# Patient Record
Sex: MALE | Race: WHITE | Employment: FULL TIME | URBAN - METROPOLITAN AREA
[De-identification: names, ages, dates, MRNs, and addresses within clinical notes are randomized per-mention and may not be internally consistent; named-entity substitution may affect disease eponyms.]

---

## 2020-10-15 ENCOUNTER — APPOINTMENT (OUTPATIENT)
Dept: ULTRASOUND IMAGING | Age: 45
End: 2020-10-15
Payer: COMMERCIAL

## 2020-10-15 ENCOUNTER — APPOINTMENT (OUTPATIENT)
Dept: CT IMAGING | Age: 45
End: 2020-10-15
Payer: COMMERCIAL

## 2020-10-15 ENCOUNTER — HOSPITAL ENCOUNTER (EMERGENCY)
Age: 45
Discharge: HOME | End: 2020-10-15
Attending: STUDENT IN AN ORGANIZED HEALTH CARE EDUCATION/TRAINING PROGRAM
Payer: COMMERCIAL

## 2020-10-15 VITALS
OXYGEN SATURATION: 97 % | BODY MASS INDEX: 25.2 KG/M2 | RESPIRATION RATE: 16 BRPM | WEIGHT: 180 LBS | TEMPERATURE: 98.5 F | DIASTOLIC BLOOD PRESSURE: 96 MMHG | HEIGHT: 71 IN | HEART RATE: 109 BPM | SYSTOLIC BLOOD PRESSURE: 160 MMHG

## 2020-10-15 LAB
ALBUMIN SERPL-MCNC: 4.2 G/DL (ref 3.5–4.6)
ALP BLD-CCNC: 53 U/L (ref 35–104)
ALT SERPL-CCNC: 34 U/L (ref 0–41)
ANION GAP SERPL CALCULATED.3IONS-SCNC: 17 MEQ/L (ref 9–15)
APTT: 28.7 SEC (ref 24.4–36.8)
AST SERPL-CCNC: 28 U/L (ref 0–40)
BASOPHILS ABSOLUTE: 0.3 K/UL (ref 0–0.2)
BASOPHILS RELATIVE PERCENT: 2.1 %
BILIRUB SERPL-MCNC: 0.5 MG/DL (ref 0.2–0.7)
BUN BLDV-MCNC: 7 MG/DL (ref 6–20)
CALCIUM SERPL-MCNC: 9 MG/DL (ref 8.5–9.9)
CHLORIDE BLD-SCNC: 100 MEQ/L (ref 95–107)
CO2: 20 MEQ/L (ref 20–31)
CREAT SERPL-MCNC: 0.64 MG/DL (ref 0.7–1.2)
EOSINOPHILS ABSOLUTE: 0.1 K/UL (ref 0–0.7)
EOSINOPHILS RELATIVE PERCENT: 0.8 %
GFR AFRICAN AMERICAN: >60
GFR AFRICAN AMERICAN: >60
GFR NON-AFRICAN AMERICAN: >60
GFR NON-AFRICAN AMERICAN: >60
GLOBULIN: 3 G/DL (ref 2.3–3.5)
GLUCOSE BLD-MCNC: 79 MG/DL (ref 70–99)
HCT VFR BLD CALC: 47.5 % (ref 42–52)
HEMOGLOBIN: 16 G/DL (ref 14–18)
INR BLD: 0.9
LACTIC ACID: 2.2 MMOL/L (ref 0.5–2.2)
LYMPHOCYTES ABSOLUTE: 2.6 K/UL (ref 1–4.8)
LYMPHOCYTES RELATIVE PERCENT: 18.2 %
MCH RBC QN AUTO: 31.1 PG (ref 27–31.3)
MCHC RBC AUTO-ENTMCNC: 33.6 % (ref 33–37)
MCV RBC AUTO: 92.4 FL (ref 80–100)
MONOCYTES ABSOLUTE: 1.2 K/UL (ref 0.2–0.8)
MONOCYTES RELATIVE PERCENT: 7.9 %
NEUTROPHILS ABSOLUTE: 10.3 K/UL (ref 1.4–6.5)
NEUTROPHILS RELATIVE PERCENT: 71 %
PDW BLD-RTO: 12.9 % (ref 11.5–14.5)
PERFORMED ON: ABNORMAL
PLATELET # BLD: 297 K/UL (ref 130–400)
POC CREATININE WHOLE BLOOD: 0.7
POC CREATININE: 0.7 MG/DL (ref 0.9–1.3)
POC SAMPLE TYPE: ABNORMAL
POTASSIUM SERPL-SCNC: 3.9 MEQ/L (ref 3.4–4.9)
PROTHROMBIN TIME: 12.4 SEC (ref 12.3–14.9)
RBC # BLD: 5.14 M/UL (ref 4.7–6.1)
REASON FOR REJECTION: NORMAL
REJECTED TEST: NORMAL
SODIUM BLD-SCNC: 137 MEQ/L (ref 135–144)
TOTAL PROTEIN: 7.2 G/DL (ref 6.3–8)
WBC # BLD: 14.5 K/UL (ref 4.8–10.8)

## 2020-10-15 PROCEDURE — 36415 COLL VENOUS BLD VENIPUNCTURE: CPT

## 2020-10-15 PROCEDURE — 83605 ASSAY OF LACTIC ACID: CPT

## 2020-10-15 PROCEDURE — 80053 COMPREHEN METABOLIC PANEL: CPT

## 2020-10-15 PROCEDURE — 6360000002 HC RX W HCPCS: Performed by: STUDENT IN AN ORGANIZED HEALTH CARE EDUCATION/TRAINING PROGRAM

## 2020-10-15 PROCEDURE — 76870 US EXAM SCROTUM: CPT

## 2020-10-15 PROCEDURE — 85025 COMPLETE CBC W/AUTO DIFF WBC: CPT

## 2020-10-15 PROCEDURE — 99284 EMERGENCY DEPT VISIT MOD MDM: CPT

## 2020-10-15 PROCEDURE — 96374 THER/PROPH/DIAG INJ IV PUSH: CPT

## 2020-10-15 PROCEDURE — 6360000004 HC RX CONTRAST MEDICATION: Performed by: STUDENT IN AN ORGANIZED HEALTH CARE EDUCATION/TRAINING PROGRAM

## 2020-10-15 PROCEDURE — 74177 CT ABD & PELVIS W/CONTRAST: CPT

## 2020-10-15 PROCEDURE — 85730 THROMBOPLASTIN TIME PARTIAL: CPT

## 2020-10-15 PROCEDURE — 2580000003 HC RX 258: Performed by: STUDENT IN AN ORGANIZED HEALTH CARE EDUCATION/TRAINING PROGRAM

## 2020-10-15 PROCEDURE — 85610 PROTHROMBIN TIME: CPT

## 2020-10-15 PROCEDURE — 93975 VASCULAR STUDY: CPT

## 2020-10-15 RX ORDER — KETOROLAC TROMETHAMINE 30 MG/ML
30 INJECTION, SOLUTION INTRAMUSCULAR; INTRAVENOUS ONCE
Status: DISCONTINUED | OUTPATIENT
Start: 2020-10-15 | End: 2020-10-15

## 2020-10-15 RX ORDER — KETOROLAC TROMETHAMINE 30 MG/ML
30 INJECTION, SOLUTION INTRAMUSCULAR; INTRAVENOUS ONCE
Status: COMPLETED | OUTPATIENT
Start: 2020-10-15 | End: 2020-10-15

## 2020-10-15 RX ORDER — OXYCODONE HYDROCHLORIDE AND ACETAMINOPHEN 5; 325 MG/1; MG/1
1 TABLET ORAL EVERY 6 HOURS PRN
Qty: 12 TABLET | Refills: 0 | Status: SHIPPED | OUTPATIENT
Start: 2020-10-15 | End: 2020-10-18

## 2020-10-15 RX ORDER — 0.9 % SODIUM CHLORIDE 0.9 %
500 INTRAVENOUS SOLUTION INTRAVENOUS ONCE
Status: COMPLETED | OUTPATIENT
Start: 2020-10-15 | End: 2020-10-15

## 2020-10-15 RX ORDER — NAPROXEN 500 MG/1
500 TABLET ORAL 2 TIMES DAILY
Qty: 20 TABLET | Refills: 0 | Status: SHIPPED | OUTPATIENT
Start: 2020-10-15 | End: 2020-10-27

## 2020-10-15 RX ADMIN — IOPAMIDOL 100 ML: 612 INJECTION, SOLUTION INTRAVENOUS at 12:05

## 2020-10-15 RX ADMIN — SODIUM CHLORIDE 500 ML: 9 INJECTION, SOLUTION INTRAVENOUS at 11:02

## 2020-10-15 RX ADMIN — KETOROLAC TROMETHAMINE 30 MG: 30 INJECTION, SOLUTION INTRAMUSCULAR; INTRAVENOUS at 11:02

## 2020-10-15 ASSESSMENT — ENCOUNTER SYMPTOMS
BACK PAIN: 0
SINUS PRESSURE: 0
SHORTNESS OF BREATH: 0
NAUSEA: 0
ABDOMINAL PAIN: 0
TROUBLE SWALLOWING: 0
CHEST TIGHTNESS: 0
VOMITING: 0
COUGH: 0
DIARRHEA: 0

## 2020-10-15 ASSESSMENT — PAIN DESCRIPTION - ORIENTATION: ORIENTATION: LEFT

## 2020-10-15 ASSESSMENT — PAIN DESCRIPTION - PAIN TYPE: TYPE: ACUTE PAIN

## 2020-10-15 ASSESSMENT — PAIN DESCRIPTION - DESCRIPTORS: DESCRIPTORS: THROBBING;SQUEEZING

## 2020-10-15 ASSESSMENT — PAIN DESCRIPTION - LOCATION: LOCATION: SCROTUM

## 2020-10-15 ASSESSMENT — PAIN DESCRIPTION - FREQUENCY: FREQUENCY: CONTINUOUS

## 2020-10-15 ASSESSMENT — PAIN SCALES - GENERAL
PAINLEVEL_OUTOF10: 9
PAINLEVEL_OUTOF10: 9

## 2020-10-15 NOTE — ED PROVIDER NOTES
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 Throughout the day, his testicle started swelling. Now the size of an egg         HISTORY OF PRESENT ILLNESS   (Location/Symptom, Timing/Onset,Context/Setting, Quality, Duration, Modifying Factors, Severity)  Note limiting factors. Nandini Dhillon is a 41 y.o. male who presents to the emergency department with complaint of groin swelling since yesterday. Patient states he was at work and he lifted a very heavy hoist and then his left testicle become swollen yesterday. Patient states now both sides are swollen. On exam patient has diffuse swelling to the scrotum. There is no redness to the skin. Patient is not able to tolerate inguinal hernia exam.  On exam his abdomen is soft and nontender. Patient denies any nausea, vomiting or diarrhea. Patient denies any pain with urination. Patient states that the scrotum itself hurts with touch and movement and position change. The history is provided by the patient. NursingNotes were reviewed. REVIEW OF SYSTEMS    (2-9 systems for level 4, 10 or more for level 5)     Review of Systems   Constitutional: Negative for activity change, appetite change, chills, fever and unexpected weight change. HENT: Negative for drooling, ear pain, nosebleeds, sinus pressure and trouble swallowing. Respiratory: Negative for cough, chest tightness and shortness of breath. Cardiovascular: Negative for chest pain and leg swelling. Gastrointestinal: Negative for abdominal pain, diarrhea, nausea and vomiting. Endocrine: Negative for polydipsia and polyphagia. Genitourinary: Positive for scrotal swelling and testicular pain.  Negative for dysuria, flank pain and frequency. Musculoskeletal: Negative for back pain and myalgias. Skin: Negative for pallor and rash. Neurological: Negative for syncope, weakness and headaches. Hematological: Does not bruise/bleed easily. All other systems reviewed and are negative. Except as noted above the remainder of the review of systems was reviewed and negative. PAST MEDICAL HISTORY   History reviewed. No pertinent past medical history. SURGICALHISTORY     History reviewed. No pertinent surgical history. CURRENT MEDICATIONS       Previous Medications    No medications on file       ALLERGIES     Patient has no known allergies. FAMILY HISTORY     History reviewed. No pertinent family history.        SOCIAL HISTORY       Social History     Socioeconomic History    Marital status: Single     Spouse name: None    Number of children: None    Years of education: None    Highest education level: None   Occupational History    None   Social Needs    Financial resource strain: None    Food insecurity     Worry: None     Inability: None    Transportation needs     Medical: None     Non-medical: None   Tobacco Use    Smoking status: Former Smoker    Smokeless tobacco: Never Used   Substance and Sexual Activity    Alcohol use: Yes     Comment: 6 pack a day    Drug use: Not Currently    Sexual activity: None   Lifestyle    Physical activity     Days per week: None     Minutes per session: None    Stress: None   Relationships    Social connections     Talks on phone: None     Gets together: None     Attends Mor[employer] service: None     Active member of club or organization: None     Attends meetings of clubs or organizations: None     Relationship status: None    Intimate partner violence     Fear of current or ex partner: None     Emotionally abused: None     Physically abused: None     Forced sexual activity: None   Other Topics Concern    None   Social History Narrative    None       SCREENINGS    Rose Mary Coma Scale  Eye Opening: Spontaneous  Best Verbal Response: Oriented  Best Motor Response: Obeys commands  Rose Mary Coma Scale Score: 15 @FLOW([id number])@      PHYSICAL EXAM    (up to 7 for level 4, 8 or more for level 5)     ED Triage Vitals [10/15/20 1030]   BP Temp Temp Source Pulse Resp SpO2 Height Weight   (!) 150/102 98.5 °F (36.9 °C) Oral 125 18 96 % 5' 11\" (1.803 m) 180 lb (81.6 kg)       Physical Exam  Vitals signs and nursing note reviewed. Constitutional:       General: He is awake. He is in acute distress ( secondary to scrotal pain). Appearance: Normal appearance. He is well-developed and normal weight. He is not ill-appearing, toxic-appearing or diaphoretic. Comments: No photophobia. No phonophobia. HENT:      Head: Normocephalic and atraumatic. No Roberts's sign. Right Ear: External ear normal.      Left Ear: External ear normal.      Nose: Nose normal. No congestion or rhinorrhea. Mouth/Throat:      Mouth: Mucous membranes are moist.      Pharynx: Oropharynx is clear. No oropharyngeal exudate or posterior oropharyngeal erythema. Eyes:      General: No scleral icterus. Right eye: No foreign body or discharge. Left eye: No discharge. Extraocular Movements: Extraocular movements intact. Conjunctiva/sclera: Conjunctivae normal.      Left eye: No exudate. Pupils: Pupils are equal, round, and reactive to light. Neck:      Musculoskeletal: Normal range of motion and neck supple. No neck rigidity. Vascular: No JVD. Trachea: No tracheal deviation. Comments: No meningismus. Cardiovascular:      Rate and Rhythm: Normal rate and regular rhythm. Heart sounds: Normal heart sounds. Heart sounds not distant. No murmur. No friction rub. No gallop. Pulmonary:      Effort: Pulmonary effort is normal. No respiratory distress. Breath sounds: Normal breath sounds. No stridor. No wheezing, rhonchi or rales. Chest:      Chest wall: No tenderness. Abdominal:      General: Abdomen is flat. Bowel sounds are normal. There is no distension. Palpations: Abdomen is soft. There is no mass. Tenderness: There is no abdominal tenderness. There is no right CVA tenderness, left CVA tenderness, guarding or rebound. Hernia: No hernia is present. Genitourinary:     Pubic Area: No rash or pubic lice. Penis: Circumcised. Scrotum/Testes:         Right: Swelling present. Left: Swelling present. Musculoskeletal: Normal range of motion. General: No swelling, tenderness, deformity or signs of injury. Lymphadenopathy:      Head:      Right side of head: No submental adenopathy. Left side of head: No submental adenopathy. Skin:     General: Skin is warm and dry. Capillary Refill: Capillary refill takes less than 2 seconds. Coloration: Skin is not jaundiced or pale. Findings: No bruising, erythema, lesion or rash. Neurological:      General: No focal deficit present. Mental Status: He is alert and oriented to person, place, and time. Mental status is at baseline. Cranial Nerves: No cranial nerve deficit. Sensory: No sensory deficit. Motor: No weakness. Coordination: Coordination normal.      Deep Tendon Reflexes: Reflexes are normal and symmetric. Psychiatric:         Mood and Affect: Mood normal.         Behavior: Behavior normal. Behavior is cooperative. Thought Content:  Thought content normal.         Judgment: Judgment normal.         DIAGNOSTIC RESULTS     EKG: All EKG's are interpreted by the Emergency Department Physician who either signs or Co-signsthis chart in the absence of a cardiologist.        RADIOLOGY:   Non-plain filmimages such as CT, Ultrasound and MRI are read by the radiologist. Plain radiographic images are visualized and preliminarily interpreted by the emergency physician with the below findings:        Interpretation per the Radiologist below, if available at the time ofthis note:    [address]   Final Result      Left epididymitis, which can be associated with heavy lifting. No evidence for torsion. US DUP ABD PEL RETRO SCROT COMPLETE   Final Result      Left epididymitis, which can be associated with heavy lifting. No evidence for torsion. CT ABDOMEN PELVIS W IV CONTRAST Additional Contrast? None   Final Result      No significant inguinal hernia. Left epididymitis, better appreciated on the recent scrotal ultrasound.                     ==========               ED BEDSIDE ULTRASOUND:   Performed by ED Physician - none    LABS:  Labs Reviewed   CBC WITH AUTO DIFFERENTIAL - Abnormal; Notable for the following components:       Result Value    WBC 14.5 (*)     Neutrophils Absolute 10.3 (*)     Monocytes Absolute 1.2 (*)     Basophils Absolute 0.3 (*)     All other components within normal limits   COMPREHENSIVE METABOLIC PANEL - Abnormal; Notable for the following components:    Anion Gap 17 (*)     CREATININE 0.64 (*)     All other components within normal limits    Narrative:     REDRAW CALLED TO JD   POCT CREATININE - URINE - Normal   PROTIME-INR   APTT   LACTIC ACID, PLASMA   SPECIMEN REJECTION    Narrative:     NOTIFIED RAFAEL ABOUT HEMOLYZED SPECIMEN   URINE RT REFLEX TO CULTURE       All other labs were within normal range or not returned as of this dictation. EMERGENCY DEPARTMENT COURSE and DIFFERENTIAL DIAGNOSIS/MDM:   Vitals:    Vitals:    10/15/20 1030 10/15/20 1219   BP: (!) 150/102 (!) 160/96   Pulse: 125 109   Resp: 18 16   Temp: 98.5 °F (36.9 °C)    TempSrc: Oral    SpO2: 96% 97%   Weight: 180 lb (81.6 kg)    Height: 5' 11\" (1.803 m)            MDM  Patient has stress-induced epididymitis.   The ER physician spoke with the radiologist to discuss an article from occupational environmental medicine for 1997 showing that this can occur. Patient denies any STD exposure. Patient states that he was lifting something on a hoist that was more than 400 pounds a felt pain. The occupational injury likely has caused this epididymitis. Patient would not be served well by getting antibiotics this is not the process. A backflow of urine probably caused the epididymitis. Findings were discussed with the patient pain medicines have been prescribed. On reexamination the findings were discussed with the patient he verbalized understanding the care. Patient will follow-up with occupational medicine. The patient has no further questions the care discussed with him. CONSULTS:  None    PROCEDURES:  Unless otherwise noted below, none     Procedures    FINAL IMPRESSION      1. Epididymitis, left          DISPOSITION/PLAN   DISPOSITION Decision To Discharge 10/15/2020 12:57:04 PM      PATIENT REFERRED TO:  Yash Crespo MD  [address] [facility] [address]  [place] [phone]    Call today  Make a follow-up arrangement      DISCHARGE MEDICATIONS:  New Prescriptions    NAPROXEN (NAPROSYN) 500 MG TABLET    Take 1 tablet by mouth 2 times daily for 20 doses    OXYCODONE-ACETAMINOPHEN (PERCOCET) 5-325 MG PER TABLET    Take 1 tablet by mouth every 6 hours as needed for Pain for up to 3 days. WARNING:  May cause drowsiness. May impair ability to operate vehicles or machinery. Do not use in combination with alcohol.           (Please note that portions of this note were completed with a voice recognition program.  Efforts were made to edit the dictations but occasionally words are mis-transcribed.)    Jorie Bosworth, DO (electronically signed)  Attending Emergency Physician          Jorie Bosworth, DO  10/15/20 2542

## 2020-10-15 NOTE — ED NOTES
This RN at bedside to assess pt. Upon assessment, pt is A/Ox4, skin p/w/d, resp even and unlabored, msp's intact.       Zak Becerra, KATARZYNA  10/15/20 5827

## 2020-10-15 NOTE — ED TRIAGE NOTES
A & Ox4. Skin pink warm and dry. States he was using a hoist and felt something pull in his left groin. States his left testicle started swelling throughout the day and now is about the size of an egg. States he could barely sleep and can't sit very easily.

## 2020-10-27 ENCOUNTER — OFFICE VISIT (OUTPATIENT)
Dept: UROLOGY | Age: 45
End: 2020-10-27
Payer: COMMERCIAL

## 2020-10-27 VITALS
HEART RATE: 124 BPM | WEIGHT: 180 LBS | DIASTOLIC BLOOD PRESSURE: 98 MMHG | SYSTOLIC BLOOD PRESSURE: 158 MMHG | HEIGHT: 71 IN | BODY MASS INDEX: 25.2 KG/M2

## 2020-10-27 LAB
BILIRUBIN, POC: ABNORMAL
BLOOD URINE, POC: ABNORMAL
CLARITY, POC: CLEAR
COLOR, POC: YELLOW
GLUCOSE URINE, POC: ABNORMAL
KETONES, POC: ABNORMAL
LEUKOCYTE EST, POC: ABNORMAL
NITRITE, POC: ABNORMAL
PH, POC: 6
PROTEIN, POC: ABNORMAL
SPECIFIC GRAVITY, POC: 1.02
UROBILINOGEN, POC: 1

## 2020-10-27 PROCEDURE — 81003 URINALYSIS AUTO W/O SCOPE: CPT | Performed by: UROLOGY

## 2020-10-27 PROCEDURE — 99203 OFFICE O/P NEW LOW 30 MIN: CPT | Performed by: UROLOGY

## 2020-10-27 NOTE — PROGRESS NOTES
[facility] EVALUATION NOTE                                                 H&P                                                                                                                                                 Reason for Visit  Left epididymitis    History of Present Illness  71-year-old male evaluated emergency room for left epididymal pain/hemiscrotal pain 2 weeks ago  Ultrasound reviewed with patient  Left epididymitis, which can be associated with heavy lifting.         No evidence for torsion     No evidence of testicular lesions  Mild left epididymal fullness  Patient currently doing well minimal discomfort  Currently on light duty  Urine culture was negative  Finished antibiotic  Urologic Review of Systems/Symptoms  No previous history of epididymitis    Review of Systems  Hospitalization: No recent admissions to the hospital  All 14 categories of Review of Systems otherwise reviewed no other findings reported. History reviewed. No pertinent past medical history. History reviewed. No pertinent surgical history.   Social History     Socioeconomic History    Marital status: Single     Spouse name: None    Number of children: None    Years of education: None    Highest education level: None   Occupational History    None   Social Needs    Financial resource strain: None    Food insecurity     Worry: None     Inability: None    Transportation needs     Medical: None     Non-medical: None   Tobacco Use    Smoking status: Never Smoker    Smokeless tobacco: Never Used   Substance and Sexual Activity    Alcohol use: Yes     Comment: 6 pack a day    Drug use: Not Currently    Sexual activity: None   Lifestyle    Physical activity     Days per week: None     Minutes per session: None    Stress: None   Relationships    Social connections     Talks on phone: None     Gets together: None     Attends Jew service: None     Active member of club or organization: None Attends meetings of clubs or organizations: None     Relationship status: None    Intimate partner violence     Fear of current or ex partner: None     Emotionally abused: None     Physically abused: None     Forced sexual activity: None   Other Topics Concern    None   Social History Narrative    None     History reviewed. No pertinent family history. No current outpatient medications on file. No current facility-administered medications for this visit. Patient has no known allergies. All reviewed and verified by Dr Oj Hayden on today's visit    No results found for: PSA, PSADIA  Results for POC orders placed in visit on 10/27/20   POCT Urinalysis No Micro (Auto)   Result Value Ref Range    Color, UA yellow     Clarity, UA clear     Glucose, UA POC neg     Bilirubin, UA small     Ketones, UA neg     Spec Grav, UA 1.025     Blood, UA POC neg     pH, UA 6.0     Protein, UA POC neg     Urobilinogen, UA 1.0     Leukocytes, UA neg     Nitrite, UA neg        Physical Exam  Vitals:    10/27/20 0854 10/27/20 0859   BP: (!) 170/102 (!) 158/98   Pulse: 124    Weight: 180 lb (81.6 kg)    Height: 5' 11\" (1.803 m)      Constitutional: Not in distress. Head and Neck: No lymphadenopathy  Cardiovascular: Normal rate, BP reviewed. Normal rhythm  Pulmonary/Chest: Normal respiratory effort no wheezing  Abdominal: Not distended. No hernias  Urologic Exam  Circumcised penis normal meatus. Right testicle mildly tender question of epididymal cysts. Left testicle mildly tender questionable epididymal cysts minimal hydrocele  Prostate exam not indicated. Musculoskeletal: Ambulatory. Extremities: No edema  Neurological: Intact  Skin: No rashes  Psychiatric: Normal affect.   Assessment/Medical Necessity-Decision Making  Acute left epididymoorchitis probably secondary to ruptured epididymal cyst no evidence of infection all cultures were negative  Patient's symptoms have improved significantly  Exam essentially unremarkable other than epididymal fullness  Ultrasound unremarkable  Plan  Finish antibiotic  Anti-inflammatories if flareup  Follow-up in office if self-exam changes  Greater than 50% of 35 minutes spent consulting patient face-to-face  Orders Placed This Encounter   Procedures    POCT Urinalysis No Micro (Auto)     No orders of the defined types were placed in this encounter. Chris Christiansen MD       Please note this report has been partially produced using speech recognition software  And may cause contain errors related to that system including grammar, punctuation and spelling as well as words and phrases that may seem inappropriate. If there are questions or concerns please feel free to contact me to clarify.

## 2021-05-04 ENCOUNTER — APPOINTMENT (OUTPATIENT)
Dept: CT IMAGING | Age: 46
End: 2021-05-04
Payer: COMMERCIAL

## 2021-05-04 ENCOUNTER — APPOINTMENT (OUTPATIENT)
Dept: GENERAL RADIOLOGY | Age: 46
End: 2021-05-04
Payer: COMMERCIAL

## 2021-05-04 ENCOUNTER — HOSPITAL ENCOUNTER (EMERGENCY)
Age: 46
Discharge: HOME | End: 2021-05-04
Payer: COMMERCIAL

## 2021-05-04 VITALS
HEART RATE: 106 BPM | OXYGEN SATURATION: 100 % | SYSTOLIC BLOOD PRESSURE: 119 MMHG | WEIGHT: 185 LBS | BODY MASS INDEX: 25.9 KG/M2 | DIASTOLIC BLOOD PRESSURE: 85 MMHG | RESPIRATION RATE: 15 BRPM | HEIGHT: 71 IN | TEMPERATURE: 98.3 F

## 2021-05-04 DIAGNOSIS — K92.2 UPPER GI BLEED: Primary | ICD-10-CM

## 2021-05-04 LAB
ALBUMIN SERPL-MCNC: 3.9 G/DL (ref 3.5–4.6)
ALP BLD-CCNC: 40 U/L (ref 35–104)
ALT SERPL-CCNC: 16 U/L (ref 0–41)
AMPHETAMINE SCREEN, URINE: ABNORMAL
ANION GAP SERPL CALCULATED.3IONS-SCNC: 11 MEQ/L (ref 9–15)
AST SERPL-CCNC: 19 U/L (ref 0–40)
BARBITURATE SCREEN URINE: ABNORMAL
BASOPHILS ABSOLUTE: 0.1 K/UL (ref 0–0.2)
BASOPHILS RELATIVE PERCENT: 0.6 %
BENZODIAZEPINE SCREEN, URINE: ABNORMAL
BILIRUB SERPL-MCNC: 0.3 MG/DL (ref 0.2–0.7)
BUN BLDV-MCNC: 20 MG/DL (ref 6–20)
CALCIUM SERPL-MCNC: 9.1 MG/DL (ref 8.5–9.9)
CANNABINOID SCREEN URINE: POSITIVE
CHLORIDE BLD-SCNC: 101 MEQ/L (ref 95–107)
CO2: 26 MEQ/L (ref 20–31)
COCAINE METABOLITE SCREEN URINE: ABNORMAL
CREAT SERPL-MCNC: 0.71 MG/DL (ref 0.7–1.2)
EKG ATRIAL RATE: 134 BPM
EKG P AXIS: 55 DEGREES
EKG P-R INTERVAL: 120 MS
EKG Q-T INTERVAL: 312 MS
EKG QRS DURATION: 78 MS
EKG QTC CALCULATION (BAZETT): 465 MS
EKG R AXIS: 32 DEGREES
EKG T AXIS: 44 DEGREES
EKG VENTRICULAR RATE: 134 BPM
EOSINOPHILS ABSOLUTE: 0.1 K/UL (ref 0–0.7)
EOSINOPHILS RELATIVE PERCENT: 0.6 %
ETHANOL PERCENT: NORMAL G/DL
ETHANOL: <10 MG/DL (ref 0–0.08)
GFR AFRICAN AMERICAN: >60
GFR AFRICAN AMERICAN: >60
GFR NON-AFRICAN AMERICAN: >60
GFR NON-AFRICAN AMERICAN: >60
GLOBULIN: 1.8 G/DL (ref 2.3–3.5)
GLUCOSE BLD-MCNC: 125 MG/DL (ref 70–99)
HCT VFR BLD CALC: 25.6 % (ref 42–52)
HEMOGLOBIN: 8.7 G/DL (ref 14–18)
LYMPHOCYTES ABSOLUTE: 2.3 K/UL (ref 1–4.8)
LYMPHOCYTES RELATIVE PERCENT: 28.2 %
Lab: ABNORMAL
MCH RBC QN AUTO: 31.3 PG (ref 27–31.3)
MCHC RBC AUTO-ENTMCNC: 34.2 % (ref 33–37)
MCV RBC AUTO: 91.7 FL (ref 80–100)
METHADONE SCREEN, URINE: ABNORMAL
MONOCYTES ABSOLUTE: 0.6 K/UL (ref 0.2–0.8)
MONOCYTES RELATIVE PERCENT: 7.5 %
NEUTROPHILS ABSOLUTE: 5 K/UL (ref 1.4–6.5)
NEUTROPHILS RELATIVE PERCENT: 63.1 %
OPIATE SCREEN URINE: ABNORMAL
OXYCODONE URINE: ABNORMAL
PDW BLD-RTO: 13.4 % (ref 11.5–14.5)
PERFORMED ON: ABNORMAL
PHENCYCLIDINE SCREEN URINE: ABNORMAL
PLATELET # BLD: 253 K/UL (ref 130–400)
POC CREATININE: 0.6 MG/DL (ref 0.9–1.3)
POC SAMPLE TYPE: ABNORMAL
POTASSIUM SERPL-SCNC: 3.6 MEQ/L (ref 3.4–4.9)
PROCALCITONIN: 0.2 NG/ML (ref 0–0.15)
PROPOXYPHENE SCREEN: ABNORMAL
RBC # BLD: 2.79 M/UL (ref 4.7–6.1)
SARS-COV-2, NAAT: NOT DETECTED
SODIUM BLD-SCNC: 138 MEQ/L (ref 135–144)
TOTAL PROTEIN: 5.7 G/DL (ref 6.3–8)
TROPONIN: <0.01 NG/ML (ref 0–0.01)
WBC # BLD: 8 K/UL (ref 4.8–10.8)

## 2021-05-04 PROCEDURE — 93005 ELECTROCARDIOGRAM TRACING: CPT | Performed by: PHYSICIAN ASSISTANT

## 2021-05-04 PROCEDURE — 74177 CT ABD & PELVIS W/CONTRAST: CPT

## 2021-05-04 PROCEDURE — 6360000004 HC RX CONTRAST MEDICATION: Performed by: PHYSICIAN ASSISTANT

## 2021-05-04 PROCEDURE — 84484 ASSAY OF TROPONIN QUANT: CPT

## 2021-05-04 PROCEDURE — 85025 COMPLETE CBC W/AUTO DIFF WBC: CPT

## 2021-05-04 PROCEDURE — C9113 INJ PANTOPRAZOLE SODIUM, VIA: HCPCS | Performed by: PHYSICIAN ASSISTANT

## 2021-05-04 PROCEDURE — 87635 SARS-COV-2 COVID-19 AMP PRB: CPT

## 2021-05-04 PROCEDURE — 36415 COLL VENOUS BLD VENIPUNCTURE: CPT

## 2021-05-04 PROCEDURE — 2580000003 HC RX 258: Performed by: PHYSICIAN ASSISTANT

## 2021-05-04 PROCEDURE — 93010 ELECTROCARDIOGRAM REPORT: CPT | Performed by: INTERNAL MEDICINE

## 2021-05-04 PROCEDURE — 71045 X-RAY EXAM CHEST 1 VIEW: CPT

## 2021-05-04 PROCEDURE — 84145 PROCALCITONIN (PCT): CPT

## 2021-05-04 PROCEDURE — 80307 DRUG TEST PRSMV CHEM ANLYZR: CPT

## 2021-05-04 PROCEDURE — 71275 CT ANGIOGRAPHY CHEST: CPT

## 2021-05-04 PROCEDURE — 80053 COMPREHEN METABOLIC PANEL: CPT

## 2021-05-04 PROCEDURE — 99283 EMERGENCY DEPT VISIT LOW MDM: CPT

## 2021-05-04 PROCEDURE — 96374 THER/PROPH/DIAG INJ IV PUSH: CPT

## 2021-05-04 PROCEDURE — 6360000002 HC RX W HCPCS: Performed by: PHYSICIAN ASSISTANT

## 2021-05-04 PROCEDURE — 82077 ASSAY SPEC XCP UR&BREATH IA: CPT

## 2021-05-04 RX ORDER — PANTOPRAZOLE SODIUM 40 MG/1
40 TABLET, DELAYED RELEASE ORAL
Qty: 28 TABLET | Refills: 0 | Status: SHIPPED | OUTPATIENT
Start: 2021-05-04

## 2021-05-04 RX ORDER — 0.9 % SODIUM CHLORIDE 0.9 %
1000 INTRAVENOUS SOLUTION INTRAVENOUS ONCE
Status: COMPLETED | OUTPATIENT
Start: 2021-05-04 | End: 2021-05-04

## 2021-05-04 RX ORDER — SODIUM CHLORIDE 9 MG/ML
10 INJECTION INTRAVENOUS ONCE
Status: COMPLETED | OUTPATIENT
Start: 2021-05-04 | End: 2021-05-04

## 2021-05-04 RX ORDER — ONDANSETRON 2 MG/ML
4 INJECTION INTRAMUSCULAR; INTRAVENOUS ONCE
Status: DISCONTINUED | OUTPATIENT
Start: 2021-05-04 | End: 2021-05-04 | Stop reason: HOSPADM

## 2021-05-04 RX ORDER — PANTOPRAZOLE SODIUM 40 MG/10ML
40 INJECTION, POWDER, LYOPHILIZED, FOR SOLUTION INTRAVENOUS ONCE
Status: COMPLETED | OUTPATIENT
Start: 2021-05-04 | End: 2021-05-04

## 2021-05-04 RX ADMIN — SODIUM CHLORIDE 1000 ML: 9 INJECTION, SOLUTION INTRAVENOUS at 11:14

## 2021-05-04 RX ADMIN — SODIUM CHLORIDE 1000 ML: 9 INJECTION, SOLUTION INTRAVENOUS at 11:15

## 2021-05-04 RX ADMIN — SODIUM CHLORIDE, PRESERVATIVE FREE 10 ML: 5 INJECTION INTRAVENOUS at 13:01

## 2021-05-04 RX ADMIN — PANTOPRAZOLE SODIUM 40 MG: 40 INJECTION, POWDER, FOR SOLUTION INTRAVENOUS at 13:01

## 2021-05-04 RX ADMIN — IOPAMIDOL 100 ML: 755 INJECTION, SOLUTION INTRAVENOUS at 12:04

## 2021-05-04 ASSESSMENT — ENCOUNTER SYMPTOMS
EYE PAIN: 0
COUGH: 1
BACK PAIN: 0
PHOTOPHOBIA: 0
RHINORRHEA: 0
SHORTNESS OF BREATH: 1
ABDOMINAL PAIN: 0
VOMITING: 1
NAUSEA: 1
SORE THROAT: 0
DIARRHEA: 0

## 2021-05-04 NOTE — ED NOTES
Educated pt about the importance of following up with GI. Pt states understanding.       Dipak Olivier RN  05/04/21 9796

## 2021-05-04 NOTE — ED TRIAGE NOTES
Pt was sent to the ER from a urgent care for an elevated heart rate, c/o SOB, body aches and vomiting, Pt is A&OX3, calm, ambulatory, afebrile, breathes are equal and unlabored.

## 2021-05-04 NOTE — ED NOTES
Pt arrived with complaint of shortness of breath. Pt reports the shortness of breath is intermittent. Pt reports the shortness of breath started about Sunday. Pt reports they have not taken any medications. Pt reports activity increases the shortness of breath. Pt reports sitting decreases the shortness of breath. Pt states that fatigue, cold sweats, vomiting and nausea. Pt is A & O x 4 . Pt arrived with elevated HR after going to the urgent care. Pt states that he hasn't been feeling right since about Sunday. States that he has seasonal allergies and that he has had allergies but that he was nauseous yesterday and vomited. Pt states that at this time he is not having any chest pain or discomfort. Pt states that he has been SOB and that he has [place]ovanna hr\". Pt states lung sounds slightly diminished.       Jose Caputo RN  05/04/21 1128

## 2021-05-04 NOTE — ED PROVIDER NOTES
[address] [facility] ED  eMERGENCY dEPARTMENTeNCOUnter      Pt Name: Ramses Sorensen  MRN: [id number]  [place] 1975  Date ofevaluation: 5/4/2021  Provider: José Miguel Coronado PA-C    CHIEF COMPLAINT       Chief Complaint   Patient presents with    Tachycardia     pt was sent to the ER from urgent care for a rapid heart rate, c/o SOB,  body aches, and vomiting for the past two days         HISTORY OF PRESENT ILLNESS   (Location/Symptom, Timing/Onset,Context/Setting, Quality, Duration, Modifying Factors, Severity)  Note limiting factors. Ramses Sorensen is a 40 y.o. male who presents to the emergency department shortness of breath body aches nausea and vomiting. Patient was sent over from urgent care due to tachycardia. He states that he does feel like his heart is racing when he does go up the steps and gets little short of breath. He denies any chest pain though. HPI    NursingNotes were reviewed. REVIEW OF SYSTEMS    (2-9 systems for level 4, 10 or more for level 5)     Review of Systems   Constitutional: Negative for chills, diaphoresis, fatigue and fever. HENT: Negative for congestion, rhinorrhea and sore throat. Eyes: Negative for photophobia and pain. Respiratory: Positive for cough and shortness of breath. Cardiovascular: Positive for palpitations. Negative for chest pain. Gastrointestinal: Positive for nausea and vomiting. Negative for abdominal pain and diarrhea. Genitourinary: Negative for dysuria and flank pain. Musculoskeletal: Negative for back pain. Skin: Negative for rash. Neurological: Negative for dizziness, light-headedness and headaches. Psychiatric/Behavioral: Negative. All other systems reviewed and are negative. Except as noted above the remainder of the review of systems was reviewed and negative. PAST MEDICAL HISTORY     Past Medical History:   Diagnosis Date    Asthma          SURGICALHISTORY     History reviewed.  No pertinent surgical atraumatic. Eyes:      General: Lids are normal.      Conjunctiva/sclera: Conjunctivae normal.   Neck:      Musculoskeletal: Normal range of motion and neck supple. Cardiovascular:      Rate and Rhythm: Normal rate and regular rhythm. Pulses: Normal pulses. Heart sounds: Normal heart sounds. Pulmonary:      Effort: Pulmonary effort is normal.      Breath sounds: Normal breath sounds. Abdominal:      General: Bowel sounds are normal.      Palpations: Abdomen is soft. Tenderness: There is no abdominal tenderness. Lymphadenopathy:      Cervical: No cervical adenopathy. Skin:     General: Skin is warm and dry. Capillary Refill: Capillary refill takes less than 2 seconds. Coloration: Skin is pale. Findings: No rash. Neurological:      Mental Status: He is alert and oriented to person, place, and time. Psychiatric:         Thought Content: Thought content normal.         Judgment: Judgment normal.         DIAGNOSTIC RESULTS     EKG: All EKG's are interpreted by the Emergency Department Physician who either signs or Co-signsthis chart in the absence of a cardiologist.    Sinus tach 134bpm no acute st changes no ectopy    RADIOLOGY:   Non-plain filmimages such as CT, Ultrasound and MRI are read by the radiologist. Plain radiographic images are visualized and preliminarily interpreted by the emergency physician with the below findings:        Interpretation per the Radiologist below, if available at the time ofthis note:    CTA Chest W WO  (PE study)   Final Result   No CT evidence pulmonary embolism. All CT scans at this facility use dose modulation, iterative reconstruction, and/or weight based dosing when appropriate to reduce radiation dose to as low as reasonably achievable. CT ABDOMEN PELVIS W IV CONTRAST Additional Contrast? None   Final Result      No acute findings.          All CT scans at this facility use dose modulation, iterative reconstruction, and/or weight based dosing when appropriate to reduce radiation dose to as low as reasonably achievable. XR CHEST PORTABLE   Final Result   NO ACUTE CARDIOPULMONARY DISEASE. ED BEDSIDE ULTRASOUND:   Performed by ED Physician - none    LABS:  Labs Reviewed   COMPREHENSIVE METABOLIC PANEL - Abnormal; Notable for the following components:       Result Value    Glucose 125 (*)     Total Protein 5.7 (*)     Globulin 1.8 (*)     All other components within normal limits   URINE DRUG SCREEN - Abnormal; Notable for the following components:    Cannabinoid Scrn, Ur POSITIVE (*)     All other components within normal limits   CBC WITH AUTO DIFFERENTIAL - Abnormal; Notable for the following components:    RBC 2.79 (*)     Hemoglobin 8.7 (*)     Hematocrit 25.6 (*)     All other components within normal limits   PROCALCITONIN - Abnormal; Notable for the following components:    Procalcitonin 0.20 (*)     All other components within normal limits   COVID-19, RAPID   ETHANOL   TROPONIN       All other labs were within normal range or not returned as of this dictation. EMERGENCY DEPARTMENT COURSE and DIFFERENTIAL DIAGNOSIS/MDM:   Vitals:    Vitals:    05/04/21 1130 05/04/21 1215 05/04/21 1230 05/04/21 1315   BP: 123/79 (!) 128/101 119/85    Pulse: 110 115 119 106   Resp: 14 18 15    Temp:       TempSrc:       SpO2: 100% 100% 100% 100%   Weight:       Height:               MDM     Pt is tachycardic and having sob. Hemoglobin found to be 8.7, when baseline 6months ago was 16. I think symptoms are from a GI bleed. Pt declines rectal exam to check for blood in stool. He denies dark stool or blood in his stool. Rectal exam shows black stool that is guaic positive. Pt symptoms I believe are from a GI bleed. Pt was given protonix. Pt was given 2L Iv fluids. Patient declines is scheduling him a GI appointment. I advised for patient to follow-up with GI tomorrow.   Vies him to return to the ED for dizziness headache shortness of breath fatigue weakness palpitations. Patient verbalizes understanding he was started on Protonix outpatient only and encouraged to follow-up with GI immediately. Patient stable for discharge. REASSESSMENT          CRITICAL CARE TIME   Total Critical Care time was  minutes, excluding separatelyreportable procedures. There was a high probability ofclinically significant/life threatening deterioration in the patient's condition which required my urgent intervention. CONSULTS:  None    PROCEDURES:  Unless otherwise noted below, none     Procedures    FINAL IMPRESSION      1.  Upper GI bleed          DISPOSITION/PLAN   DISPOSITION        PATIENT REFERREDTO:  Vito Overton MD  [address] [place] [address] [phone]    Schedule an appointment as soon as possible for a visit in 2 days        DISCHARGEMEDICATIONS:  Discharge Medication List as of 5/4/2021  1:19 PM      START taking these medications    Details   pantoprazole (PROTONIX) 40 MG tablet Take 1 tablet by mouth 2 times daily (before meals), Disp-28 tablet, R-0Print                (Please note that portions of this note were completed with a voice recognition program.  Efforts were made to edit the dictations but occasionally words are mis-transcribed.)    Abiel Mcghee (electronically signed)  Attending Emergency Physician         Yvan Reyes PA-C  05/04/21 0789

## 2021-05-05 ENCOUNTER — TELEPHONE (OUTPATIENT)
Dept: GASTROENTEROLOGY | Age: 46
End: 2021-05-05

## 2021-05-05 NOTE — TELEPHONE ENCOUNTER
----- Message from Joshua Cooper MD sent at 5/5/2021  9:20 AM EDT -----  Patient presented to the emergency room yesterday with significant anemia. Was reluctant to get a exam and also to have a GI evaluation. Please call and see if he can come in today. Make a telephone encounter that you reached out. Also document if he chooses not to come today.   Thanks  Lennox Qublanche  ----- Message -----  From: Automatic Discharge Provider  Sent: 5/4/2021   1:25 PM EDT  To: Joshua Cooper MD

## 2021-10-29 ENCOUNTER — TELEPHONE (OUTPATIENT)
Dept: GASTROENTEROLOGY | Facility: CLINIC | Age: 46
End: 2021-10-29

## 2021-10-29 ENCOUNTER — OFFICE VISIT (OUTPATIENT)
Dept: GASTROENTEROLOGY | Facility: CLINIC | Age: 46
End: 2021-10-29
Payer: COMMERCIAL

## 2021-10-29 VITALS
BODY MASS INDEX: 20.62 KG/M2 | HEART RATE: 100 BPM | WEIGHT: 144 LBS | DIASTOLIC BLOOD PRESSURE: 90 MMHG | TEMPERATURE: 97.1 F | HEIGHT: 70 IN | OXYGEN SATURATION: 99 % | SYSTOLIC BLOOD PRESSURE: 138 MMHG

## 2021-10-29 DIAGNOSIS — R11.2 NAUSEA AND VOMITING, INTRACTABILITY OF VOMITING NOT SPECIFIED, UNSPECIFIED VOMITING TYPE: ICD-10-CM

## 2021-10-29 DIAGNOSIS — R10.13 EPIGASTRIC PAIN: Primary | ICD-10-CM

## 2021-10-29 DIAGNOSIS — L98.9 SKIN LESION: ICD-10-CM

## 2021-10-29 DIAGNOSIS — Z80.0 FAMILY HISTORY OF COLON CANCER IN FATHER: ICD-10-CM

## 2021-10-29 DIAGNOSIS — R63.4 WEIGHT LOSS: Primary | ICD-10-CM

## 2021-10-29 DIAGNOSIS — R10.13 EPIGASTRIC PAIN: ICD-10-CM

## 2021-10-29 PROBLEM — R10.9 ABDOMINAL PAIN: Status: ACTIVE | Noted: 2020-04-16

## 2021-10-29 PROBLEM — E11.9 DM2 (DIABETES MELLITUS, TYPE 2) (HCC): Status: ACTIVE | Noted: 2020-04-16

## 2021-10-29 PROBLEM — I10 ESSENTIAL HYPERTENSION: Status: ACTIVE | Noted: 2020-04-16

## 2021-10-29 PROBLEM — K61.1 PERIRECTAL ABSCESS: Status: ACTIVE | Noted: 2021-09-16

## 2021-10-29 PROCEDURE — 99204 OFFICE O/P NEW MOD 45 MIN: CPT | Performed by: INTERNAL MEDICINE

## 2021-10-29 RX ORDER — SUCRALFATE 1 G/1
1 TABLET ORAL 4 TIMES DAILY
Qty: 120 TABLET | Refills: 1 | Status: SHIPPED | OUTPATIENT
Start: 2021-10-29 | End: 2022-01-03

## 2021-10-29 RX ORDER — OMEPRAZOLE 20 MG/1
20 CAPSULE, DELAYED RELEASE ORAL DAILY
COMMUNITY
End: 2021-10-29

## 2021-10-29 RX ORDER — PIOGLITAZONEHYDROCHLORIDE 15 MG/1
15 TABLET ORAL DAILY
COMMUNITY
Start: 2021-10-20 | End: 2022-10-20

## 2021-10-29 RX ORDER — OMEPRAZOLE 40 MG/1
40 CAPSULE, DELAYED RELEASE ORAL
Qty: 60 CAPSULE | Refills: 3 | Status: SHIPPED | OUTPATIENT
Start: 2021-10-29 | End: 2022-06-14

## 2021-10-29 RX ORDER — ONDANSETRON 4 MG/1
4 TABLET, FILM COATED ORAL EVERY 8 HOURS PRN
Qty: 30 TABLET | Refills: 0 | Status: SHIPPED | OUTPATIENT
Start: 2021-10-29 | End: 2021-10-29

## 2021-10-29 RX ORDER — SCOLOPAMINE TRANSDERMAL SYSTEM 1 MG/1
1 PATCH, EXTENDED RELEASE TRANSDERMAL
Qty: 10 PATCH | Refills: 0 | Status: SHIPPED | OUTPATIENT
Start: 2021-10-29 | End: 2021-11-28

## 2021-10-29 RX ORDER — LOSARTAN POTASSIUM 100 MG/1
100 TABLET ORAL DAILY
COMMUNITY

## 2021-10-29 RX ORDER — DAPAGLIFLOZIN AND METFORMIN HYDROCHLORIDE 5; 1000 MG/1; MG/1
1 TABLET, FILM COATED, EXTENDED RELEASE ORAL 2 TIMES DAILY
COMMUNITY
Start: 2021-10-20

## 2021-10-29 RX ORDER — SUCRALFATE ORAL 1 G/10ML
1 SUSPENSION ORAL 4 TIMES DAILY
Qty: 1200 ML | Refills: 1 | Status: SHIPPED | OUTPATIENT
Start: 2021-10-29 | End: 2021-10-29

## 2021-10-29 RX ORDER — SILDENAFIL 50 MG/1
50 TABLET, FILM COATED ORAL
COMMUNITY
Start: 2021-10-20 | End: 2022-10-20

## 2021-10-29 RX ORDER — FAMOTIDINE 40 MG/1
40 TABLET, FILM COATED ORAL DAILY
COMMUNITY
End: 2022-06-14

## 2021-11-01 ENCOUNTER — TELEPHONE (OUTPATIENT)
Dept: PREADMISSION TESTING | Facility: HOSPITAL | Age: 46
End: 2021-11-01

## 2021-11-10 ENCOUNTER — ANESTHESIA EVENT (OUTPATIENT)
Dept: ANESTHESIOLOGY | Facility: HOSPITAL | Age: 46
End: 2021-11-10

## 2021-11-10 ENCOUNTER — ANESTHESIA (OUTPATIENT)
Dept: ANESTHESIOLOGY | Facility: HOSPITAL | Age: 46
End: 2021-11-10

## 2021-11-10 ENCOUNTER — TELEPHONE (OUTPATIENT)
Dept: GASTROENTEROLOGY | Facility: AMBULARY SURGERY CENTER | Age: 46
End: 2021-11-10

## 2021-11-10 ENCOUNTER — TELEPHONE (OUTPATIENT)
Dept: GASTROENTEROLOGY | Facility: HOSPITAL | Age: 46
End: 2021-11-10

## 2021-11-11 ENCOUNTER — ANESTHESIA EVENT (OUTPATIENT)
Dept: GASTROENTEROLOGY | Facility: HOSPITAL | Age: 46
End: 2021-11-11

## 2021-11-11 ENCOUNTER — HOSPITAL ENCOUNTER (OUTPATIENT)
Dept: GASTROENTEROLOGY | Facility: HOSPITAL | Age: 46
Setting detail: OUTPATIENT SURGERY
Discharge: HOME | End: 2021-11-11
Attending: INTERNAL MEDICINE | Admitting: INTERNAL MEDICINE
Payer: COMMERCIAL

## 2021-11-11 ENCOUNTER — ANESTHESIA (OUTPATIENT)
Dept: GASTROENTEROLOGY | Facility: HOSPITAL | Age: 46
End: 2021-11-11

## 2021-11-11 VITALS
OXYGEN SATURATION: 97 % | HEIGHT: 70 IN | BODY MASS INDEX: 20.76 KG/M2 | DIASTOLIC BLOOD PRESSURE: 67 MMHG | SYSTOLIC BLOOD PRESSURE: 111 MMHG | TEMPERATURE: 99.3 F | WEIGHT: 145 LBS | RESPIRATION RATE: 18 BRPM | HEART RATE: 93 BPM

## 2021-11-11 DIAGNOSIS — Z80.0 FAMILY HISTORY OF COLON CANCER IN FATHER: ICD-10-CM

## 2021-11-11 DIAGNOSIS — R63.4 WEIGHT LOSS: ICD-10-CM

## 2021-11-11 DIAGNOSIS — R10.13 EPIGASTRIC PAIN: ICD-10-CM

## 2021-11-11 LAB — GLUCOSE SERPL-MCNC: 196 MG/DL (ref 65–140)

## 2021-11-11 PROCEDURE — 88342 IMHCHEM/IMCYTCHM 1ST ANTB: CPT | Performed by: PATHOLOGY

## 2021-11-11 PROCEDURE — 88305 TISSUE EXAM BY PATHOLOGIST: CPT | Performed by: PATHOLOGY

## 2021-11-11 PROCEDURE — 82948 REAGENT STRIP/BLOOD GLUCOSE: CPT

## 2021-11-11 PROCEDURE — 45380 COLONOSCOPY AND BIOPSY: CPT | Performed by: INTERNAL MEDICINE

## 2021-11-11 PROCEDURE — 43239 EGD BIOPSY SINGLE/MULTIPLE: CPT | Performed by: INTERNAL MEDICINE

## 2021-11-11 RX ORDER — GLYCOPYRROLATE 0.2 MG/ML
INJECTION INTRAMUSCULAR; INTRAVENOUS AS NEEDED
Status: DISCONTINUED | OUTPATIENT
Start: 2021-11-11 | End: 2021-11-11

## 2021-11-11 RX ORDER — FENTANYL CITRATE 50 UG/ML
INJECTION, SOLUTION INTRAMUSCULAR; INTRAVENOUS AS NEEDED
Status: DISCONTINUED | OUTPATIENT
Start: 2021-11-11 | End: 2021-11-11

## 2021-11-11 RX ORDER — PROPOFOL 10 MG/ML
INJECTION, EMULSION INTRAVENOUS AS NEEDED
Status: DISCONTINUED | OUTPATIENT
Start: 2021-11-11 | End: 2021-11-11

## 2021-11-11 RX ORDER — SODIUM CHLORIDE 9 MG/ML
INJECTION, SOLUTION INTRAVENOUS CONTINUOUS PRN
Status: DISCONTINUED | OUTPATIENT
Start: 2021-11-11 | End: 2021-11-11

## 2021-11-11 RX ORDER — PROPOFOL 10 MG/ML
INJECTION, EMULSION INTRAVENOUS CONTINUOUS PRN
Status: DISCONTINUED | OUTPATIENT
Start: 2021-11-11 | End: 2021-11-11

## 2021-11-11 RX ADMIN — PROPOFOL 150 MCG/KG/MIN: 10 INJECTION, EMULSION INTRAVENOUS at 15:02

## 2021-11-11 RX ADMIN — FENTANYL CITRATE 25 MCG: 50 INJECTION, SOLUTION INTRAMUSCULAR; INTRAVENOUS at 15:20

## 2021-11-11 RX ADMIN — SODIUM CHLORIDE: 0.9 INJECTION, SOLUTION INTRAVENOUS at 15:00

## 2021-11-11 RX ADMIN — PROPOFOL 150 MG: 10 INJECTION, EMULSION INTRAVENOUS at 15:02

## 2021-11-11 RX ADMIN — FENTANYL CITRATE 25 MCG: 50 INJECTION, SOLUTION INTRAMUSCULAR; INTRAVENOUS at 15:14

## 2021-11-11 RX ADMIN — FENTANYL CITRATE 50 MCG: 50 INJECTION, SOLUTION INTRAMUSCULAR; INTRAVENOUS at 15:00

## 2021-11-11 RX ADMIN — GLYCOPYRROLATE 0.2 MG: 0.2 INJECTION, SOLUTION INTRAMUSCULAR; INTRAVENOUS at 15:00

## 2021-11-15 LAB
CRP SERPL-MCNC: 12 MG/L
IGA SERPL-MCNC: 706 MG/DL (ref 47–310)
MICRODELETION SYND BLD/T FISH: ABNORMAL
TSH SERPL-ACNC: 0.93 MIU/L (ref 0.4–4.5)
TTG IGA SER-ACNC: <1 U/ML

## 2021-11-17 ENCOUNTER — PREP FOR PROCEDURE (OUTPATIENT)
Dept: GASTROENTEROLOGY | Facility: CLINIC | Age: 46
End: 2021-11-17

## 2021-11-17 DIAGNOSIS — K63.5 POLYP OF COLON, UNSPECIFIED PART OF COLON, UNSPECIFIED TYPE: ICD-10-CM

## 2021-11-17 DIAGNOSIS — R63.4 WEIGHT LOSS: Primary | ICD-10-CM

## 2022-01-03 DIAGNOSIS — R11.2 NAUSEA AND VOMITING, INTRACTABILITY OF VOMITING NOT SPECIFIED, UNSPECIFIED VOMITING TYPE: ICD-10-CM

## 2022-01-03 DIAGNOSIS — R10.13 EPIGASTRIC PAIN: ICD-10-CM

## 2022-01-03 RX ORDER — SUCRALFATE 1 G/1
TABLET ORAL
Qty: 120 TABLET | Refills: 1 | Status: SHIPPED | OUTPATIENT
Start: 2022-01-03 | End: 2022-01-03

## 2022-01-21 ENCOUNTER — OFFICE VISIT (OUTPATIENT)
Dept: GASTROENTEROLOGY | Facility: CLINIC | Age: 47
End: 2022-01-21
Payer: COMMERCIAL

## 2022-01-21 VITALS
TEMPERATURE: 97.5 F | BODY MASS INDEX: 20.9 KG/M2 | HEIGHT: 70 IN | DIASTOLIC BLOOD PRESSURE: 70 MMHG | SYSTOLIC BLOOD PRESSURE: 130 MMHG | WEIGHT: 146 LBS

## 2022-01-21 DIAGNOSIS — R10.13 EPIGASTRIC PAIN: ICD-10-CM

## 2022-01-21 DIAGNOSIS — R11.2 NAUSEA AND VOMITING, INTRACTABILITY OF VOMITING NOT SPECIFIED, UNSPECIFIED VOMITING TYPE: Primary | ICD-10-CM

## 2022-01-21 DIAGNOSIS — E11.9 TYPE 2 DIABETES MELLITUS WITHOUT COMPLICATION, WITHOUT LONG-TERM CURRENT USE OF INSULIN (HCC): ICD-10-CM

## 2022-01-21 DIAGNOSIS — R63.4 WEIGHT LOSS: ICD-10-CM

## 2022-01-21 DIAGNOSIS — K21.9 GASTROESOPHAGEAL REFLUX DISEASE WITHOUT ESOPHAGITIS: ICD-10-CM

## 2022-01-21 PROCEDURE — 99214 OFFICE O/P EST MOD 30 MIN: CPT | Performed by: PHYSICIAN ASSISTANT

## 2022-01-21 RX ORDER — METOCLOPRAMIDE 5 MG/1
TABLET ORAL
Qty: 120 TABLET | Refills: 2 | Status: SHIPPED | OUTPATIENT
Start: 2022-01-21

## 2022-01-21 NOTE — PATIENT INSTRUCTIONS
Please continue omeprazole 40 mg twice daily, about 30 minutes before breakfast and dinner  You may continue Carafate up to 4 times daily if you find benefit  Start taking Reglan before meals to help with stomach emptying  Schedule gastric emptying study to help confirm diagnosis  Please stop Reglan at least 2-3 days prior to this study  Start MiraLax 17 g by mouth daily  This is available over-the-counter  You can increase to twice daily after 3-5 days if necessary  Ideally we would like you to have a soft bowel movement daily or every other day  Please make sure you are drinking plenty of water goal 64 oz daily      GASTRIC EMPTYING STUDY scheduled 03/29/22 @

## 2022-01-21 NOTE — LETTER
January 21, 2022     Hank Bonner MD  [address] [place] [id number]    Patient: Grabiel Williamson   YOB: 1975   Date of Visit: 1/21/2022       Dear Dr Helen Miller:    Thank you for referring Grabiel Williamson to me for evaluation  Below are my notes for this consultation  If you have questions, please do not hesitate to call me  I look forward to following your patient along with you  Sincerely,        Gabo Alarcon PA-C        CC: Referral Self  Gabo Alarcon PA-C  1/21/2022  1:21 PM  Sign when Signing Visit  [facility] - Outpatient Follow-up Note  Grabiel Williamson 40 y o  male MRN: [id number]  Encounter: [id number]          ASSESSMENT AND PLAN:      1  Nausea and vomiting, intractability of vomiting not specified, unspecified vomiting type  2  Epigastric pain  3  Weight loss  4  Gastroesophageal reflux disease without esophagitis  5  Type 2 diabetes mellitus without complication, without long-term current use of insulin ([facility] )    This is a 52-year-old male with poorly controlled type 2 diabetes (A1c 14 7) presenting with chronic persistent nausea, vomiting, early satiety, epigastric pain, weight loss, and reflux symptoms  His symptoms are debilitating and interfering with work  Recent EGD negative for EoE, H pylori infection, and celiac disease and CT scan negative  Suspect diabetic gastroparesis is the underlying cause  We discussed importance of diet/lifestyle, glucose control, and medications to help manage symptoms      - Since he tried and failed Zofran, start trial of Reglan 5 mg before meals and at bedtime  I discussed potential rare side effect of tardive dyskinesia, so we would prefer to only do short course of Reglan if possible  - Schedule NM gastric emptying study  Stop Reglan 2-3 days prior to study  - Refer to nutrition services for specific dietary recommendations for gastroparesis and diabetes   I gave him detailed handout discussing low fat, low fiber diet and small, frequent meals   - Continue Prilosec 40 mg twice daily before meals  - He may continue Carafate up to 4 times daily, can decrease to twice daily   - Start MiraLax 17 g by mouth daily to help with constipation  He can increase to twice daily after 3-5 days if necessary   - Stay hydrated, drink goal 64 oz of water daily  - He is rescheduled for colonoscopy next month due to poor prep on last colonoscopy    Follow-up in 2 months  ______________________________________________________________________    SUBJECTIVE:  52-year-old male with poorly controlled type 2 diabetes mellitus (A1C 14 7), hypertension, dyslipidemia presenting for follow-up of nausea, vomiting, abnormal weight loss  He reports persistent symptoms since his last visit  His weight is holding stable around 145 lbs although he cannot gain any weight  He wakes up in the morning feeling sick to his stomach and often vomits 3 times prior to eating breakfast  He initially vomits his food from the night prior, then the vomit turns bilious  He then will sometimes try to eat some breakfast  His stomach will settle down then he will have more vomiting in the afternoon and evening  He is generally eating 2-3 meals per day  He describes burning pain in his epigastric region lower chest and throat  He is taking omeprazole 40 mg twice a day and Carafate 4 times daily  He also describes constipation  He is moving his bowels every 2-3 days  The stool is type 1 on the [facility] stool chart  He denies seeing any blood in his stool  He has an appointment with Endocrinology next month  He underwent EGD and colonoscopy in November 2021  EGD was normal  Colon was limited due to poor prep  There was a cecal polyp removed which was adenomatous  Biopsies from the TI were benign  EGD negative for eosinophilic esophagitis, H pylori, celiac disease  REVIEW OF SYSTEMS IS OTHERWISE NEGATIVE        Historical Information   Past Medical History:   Diagnosis Date    Diabetes mellitus ([facility] )     GERD (gastroesophageal reflux disease)     Hypertension      Past Surgical History:   Procedure Laterality Date    COLONOSCOPY      RECTAL SURGERY      fistula    UPPER GASTROINTESTINAL ENDOSCOPY       Social History   Social History     Substance and Sexual Activity   Alcohol Use Yes     Social History     Substance and Sexual Activity   Drug Use Never     Social History     Tobacco Use   Smoking Status Light Tobacco Smoker    Types: Cigars   Smokeless Tobacco Never Used     Family History   Problem Relation Age of Onset    Colon cancer Father        Meds/Allergies       Current Outpatient Medications:     Dapagliflozin-metFORMIN HCl ER (Xigduo XR) 5-1000 MG TB24    famotidine (PEPCID) 40 MG tablet    losartan (COZAAR) 100 MG tablet    omeprazole (PriLOSEC) 40 MG capsule    pioglitazone (ACTOS) 15 mg tablet    scopolamine (TRANSDERM-SCOP) 1 5 mg/3 days TD 72 hr patch    sildenafil (VIAGRA) 50 MG tablet    sucralfate (CARAFATE) 1 g tablet    No Known Allergies        Objective     Blood pressure 130/70, temperature 97 5 °F (36 4 °C), temperature source Tympanic, height 5' 10" (1 778 m), weight 66 2 kg (146 lb)  Body mass index is 20 95 kg/m²  PHYSICAL EXAM:      General Appearance:   Alert, cooperative, no distress   HEENT:   Normocephalic, atraumatic, anicteric      Neck:  Supple, symmetrical, trachea midline   Lungs:   Clear to auscultation bilaterally   Heart[de-identified]   Regular rate and rhythm; no murmur, rub, or gallop  Abdomen:   Soft, epigastric tenderness to palpation, non-distended; normal bowel sounds; no masses, no organomegaly    Genitalia:   Deferred    Rectal:   Deferred    Extremities:  No cyanosis, clubbing or edema    Pulses:  2+ and symmetric    Skin:  No jaundice, rashes, or lesions    Lymph nodes:  No palpable cervical lymphadenopathy        Lab Results:   No visits with results within 1 Day(s) from this visit     Latest known visit with results is:   Hospital Outpatient Visit on 11/11/2021   Component Date Value    POC Glucose 11/11/2021 196*    Case Report 11/11/2021                      Value:Surgical Pathology Report                         Case: [id number]                                   Authorizing Provider:  Zee White DO              Collected:           11/11/2021 1506              Ordering Location:     [facility] Received:            11/11/2021 1653                                     Heart Endoscopy                                                              Pathologist:           Nancy Albrecht MD                                                              Specimens:   A) - Duodenum, duodenal bx, r/o celiac                                                              B) - Stomach, gastric bx, r/oh pylori                                                               C) - Esophagus, esophageal bx, r/o EOE                                                              D) - Polyp, Colorectal, cecal polyp by cold forcep                                                  E) - Terminal Ileum, terminal ileum bx, r/o chrohn's                                       Addendum 11/11/2021                      Value: This result contains rich text formatting which cannot be displayed here   Final Diagnosis 11/11/2021                      Value: This result contains rich text formatting which cannot be displayed here   Note 11/11/2021                      Value: This result contains rich text formatting which cannot be displayed here   Additional Information 11/11/2021                      Value: This result contains rich text formatting which cannot be displayed here      Synoptic Checklist 11/11/2021                      Value:                            COLON/RECTUM POLYP FORM - GI - D                                                                                     :    Serrated Adenoma      Gross Description 11/11/2021 Value:This result contains rich text formatting which cannot be displayed here   Case Report 11/11/2021                      Value:Surgical Pathology Report                         Case: [id number]                                   Authorizing Provider:  Fartun Cloud MD    Collected:           11/11/2021 1526              Ordering Location:     [facility] Received:            11/11/2021 1653                                     Heart Endoscopy                                                              Pathologist:           Neema Torres MD                                                   Specimen:    Colon, random colonic bx                                                                   Final Diagnosis 11/11/2021                      Value: This result contains rich text formatting which cannot be displayed here   Additional Information 11/11/2021                      Value: This result contains rich text formatting which cannot be displayed here  Tracie Nine Gross Description 11/11/2021                      Value: This result contains rich text formatting which cannot be displayed here  Radiology Results:   No results found

## 2022-01-21 NOTE — PROGRESS NOTES
[facility] - Outpatient Follow-up Note  Vera Canchola 40 y o  male MRN: [id number]  Encounter: [id number]          ASSESSMENT AND PLAN:      1  Nausea and vomiting, intractability of vomiting not specified, unspecified vomiting type  2  Epigastric pain  3  Weight loss  4  Gastroesophageal reflux disease without esophagitis  5  Type 2 diabetes mellitus without complication, without long-term current use of insulin ([facility] )    This is a 58-year-old male with poorly controlled type 2 diabetes (A1c 14 7) presenting with chronic persistent nausea, vomiting, early satiety, epigastric pain, weight loss, and reflux symptoms  His symptoms are debilitating and interfering with work  Recent EGD negative for EoE, H pylori infection, and celiac disease and CT scan negative  Suspect diabetic gastroparesis is the underlying cause  We discussed importance of diet/lifestyle, glucose control, and medications to help manage symptoms      - Since he tried and failed Zofran, start trial of Reglan 5 mg before meals and at bedtime  I discussed potential rare side effect of tardive dyskinesia, so we would prefer to only do short course of Reglan if possible  - Schedule NM gastric emptying study  Stop Reglan 2-3 days prior to study  - Refer to nutrition services for specific dietary recommendations for gastroparesis and diabetes  I gave him detailed handout discussing low fat, low fiber diet and small, frequent meals   - Continue Prilosec 40 mg twice daily before meals  - He may continue Carafate up to 4 times daily, can decrease to twice daily   - Start MiraLax 17 g by mouth daily to help with constipation   He can increase to twice daily after 3-5 days if necessary   - Stay hydrated, drink goal 64 oz of water daily  - He is rescheduled for colonoscopy next month due to poor prep on last colonoscopy    Follow-up in 2 months  ______________________________________________________________________    SUBJECTIVE: 43-year-old male with poorly controlled type 2 diabetes mellitus (A1C 14 7), hypertension, dyslipidemia presenting for follow-up of nausea, vomiting, abnormal weight loss  He reports persistent symptoms since his last visit  His weight is holding stable around 145 lbs although he cannot gain any weight  He wakes up in the morning feeling sick to his stomach and often vomits 3 times prior to eating breakfast  He initially vomits his food from the night prior, then the vomit turns bilious  He then will sometimes try to eat some breakfast  His stomach will settle down then he will have more vomiting in the afternoon and evening  He is generally eating 2-3 meals per day  He describes burning pain in his epigastric region lower chest and throat  He is taking omeprazole 40 mg twice a day and Carafate 4 times daily  He also describes constipation  He is moving his bowels every 2-3 days  The stool is type 1 on the [facility] stool chart  He denies seeing any blood in his stool  He has an appointment with Endocrinology next month  He underwent EGD and colonoscopy in November 2021  EGD was normal  Colon was limited due to poor prep  There was a cecal polyp removed which was adenomatous  Biopsies from the TI were benign  EGD negative for eosinophilic esophagitis, H pylori, celiac disease  REVIEW OF SYSTEMS IS OTHERWISE NEGATIVE        Historical Information   Past Medical History:   Diagnosis Date    Diabetes mellitus ([address] )     GERD (gastroesophageal reflux disease)     Hypertension      Past Surgical History:   Procedure Laterality Date    COLONOSCOPY      RECTAL SURGERY      fistula    UPPER GASTROINTESTINAL ENDOSCOPY       Social History   Social History     Substance and Sexual Activity   Alcohol Use Yes     Social History     Substance and Sexual Activity   Drug Use Never     Social History     Tobacco Use   Smoking Status Light Tobacco Smoker    Types: Cigars   Smokeless Tobacco Never Used     Family History Problem Relation Age of Onset    Colon cancer Father        Meds/Allergies       Current Outpatient Medications:     Dapagliflozin-metFORMIN HCl ER (Xigduo XR) 5-1000 MG TB24    famotidine (PEPCID) 40 MG tablet    losartan (COZAAR) 100 MG tablet    omeprazole (PriLOSEC) 40 MG capsule    pioglitazone (ACTOS) 15 mg tablet    scopolamine (TRANSDERM-SCOP) 1 5 mg/3 days TD 72 hr patch    sildenafil (VIAGRA) 50 MG tablet    sucralfate (CARAFATE) 1 g tablet    No Known Allergies        Objective     Blood pressure 130/70, temperature 97 5 °F (36 4 °C), temperature source Tympanic, height 5' 10" (1 778 m), weight 66 2 kg (146 lb)  Body mass index is 20 95 kg/m²  PHYSICAL EXAM:      General Appearance:   Alert, cooperative, no distress   HEENT:   Normocephalic, atraumatic, anicteric      Neck:  Supple, symmetrical, trachea midline   Lungs:   Clear to auscultation bilaterally   Heart[de-identified]   Regular rate and rhythm; no murmur, rub, or gallop  Abdomen:   Soft, epigastric tenderness to palpation, non-distended; normal bowel sounds; no masses, no organomegaly    Genitalia:   Deferred    Rectal:   Deferred    Extremities:  No cyanosis, clubbing or edema    Pulses:  2+ and symmetric    Skin:  No jaundice, rashes, or lesions    Lymph nodes:  No palpable cervical lymphadenopathy        Lab Results:   No visits with results within 1 Day(s) from this visit     Latest known visit with results is:   Hospital Outpatient Visit on 11/11/2021   Component Date Value    POC Glucose 11/11/2021 196*    Case Report 11/11/2021                      Value:Surgical Pathology Report                         Case: [id number]                                   Authorizing Provider:  Kimmy Johnson DO              Collected:           11/11/2021 1506              Ordering Location:     [facility] Received:            11/11/2021 9392                                     Heart Endoscopy Pathologist:           Hal Munoz MD                                                              Specimens:   A) - Duodenum, duodenal bx, r/o celiac                                                              B) - Stomach, gastric bx, r/oh pylori                                                               C) - Esophagus, esophageal bx, r/o EOE                                                              D) - Polyp, Colorectal, cecal polyp by cold forcep                                                  E) - Terminal Ileum, terminal ileum bx, r/o chrohn's                                       Addendum 11/11/2021                      Value: This result contains rich text formatting which cannot be displayed here   Final Diagnosis 11/11/2021                      Value: This result contains rich text formatting which cannot be displayed here   Note 11/11/2021                      Value: This result contains rich text formatting which cannot be displayed here   Additional Information 11/11/2021                      Value: This result contains rich text formatting which cannot be displayed here   Synoptic Checklist 11/11/2021                      Value:                            COLON/RECTUM POLYP FORM - GI - D                                                                                     :    Serrated Adenoma      Gross Description 11/11/2021                      Value: This result contains rich text formatting which cannot be displayed here      Case Report 11/11/2021                      Value:Surgical Pathology Report                         Case: [id number]                                   Authorizing Provider:  Debi Osgood, MD    Collected:           11/11/2021 1526              Ordering Location:     [facility] Received:            11/11/2021 1653                                     Heart Endoscopy Pathologist:           Tiffani Rock MD                                                   Specimen:    Colon, random colonic bx                                                                   Final Diagnosis 11/11/2021                      Value: This result contains rich text formatting which cannot be displayed here   Additional Information 11/11/2021                      Value: This result contains rich text formatting which cannot be displayed here  Calli Sotelo Gross Description 11/11/2021                      Value: This result contains rich text formatting which cannot be displayed here  Radiology Results:   No results found

## 2022-02-10 ENCOUNTER — TELEPHONE (OUTPATIENT)
Dept: PREADMISSION TESTING | Facility: HOSPITAL | Age: 47
End: 2022-02-10

## 2022-02-21 ENCOUNTER — ANESTHESIA (OUTPATIENT)
Dept: ANESTHESIOLOGY | Facility: HOSPITAL | Age: 47
End: 2022-02-21

## 2022-02-21 ENCOUNTER — ANESTHESIA EVENT (OUTPATIENT)
Dept: ANESTHESIOLOGY | Facility: HOSPITAL | Age: 47
End: 2022-02-21

## 2022-02-21 ENCOUNTER — TELEPHONE (OUTPATIENT)
Dept: GASTROENTEROLOGY | Facility: HOSPITAL | Age: 47
End: 2022-02-21

## 2022-02-21 NOTE — ANESTHESIA PREPROCEDURE EVALUATION
Procedure:  PRE-OP ONLY    Relevant Problems   CARDIO   (+) Essential hypertension      ENDO   (+) DM2 (diabetes mellitus, type 2) (HCC)      Other   (+) Abdominal pain   (+) Intractable nausea and vomiting             Anesthesia Plan  ASA Score- 2     Anesthesia Type- IV sedation with anesthesia with ASA Monitors  Additional Monitors:   Airway Plan:           Plan Factors-    Chart reviewed  Existing labs reviewed  Patient summary reviewed  Induction-     Postoperative Plan-     Informed Consent- Anesthetic plan and risks discussed with patient  I personally reviewed this patient with the CRNA  Discussed and agreed on the Anesthesia Plan with the CRNA             Lab Results   Component Value Date    HGBA1C 12 0 (H) 04/16/2020       No results found for: NA, K, CL, CO2, ANIONGAP, BUN, CREATININE, GLUCOSE, GLUF, CALCIUM, CORRECTEDCA, AST, ALT, ALKPHOS, PROT, BILITOT, EGFR    No results found for: WBC, HGB, HCT, MCV, PLT

## 2022-02-22 ENCOUNTER — ANESTHESIA (OUTPATIENT)
Dept: GASTROENTEROLOGY | Facility: HOSPITAL | Age: 47
End: 2022-02-22

## 2022-02-22 ENCOUNTER — HOSPITAL ENCOUNTER (OUTPATIENT)
Dept: GASTROENTEROLOGY | Facility: HOSPITAL | Age: 47
Setting detail: OUTPATIENT SURGERY
Discharge: HOME | End: 2022-02-22
Attending: INTERNAL MEDICINE
Payer: COMMERCIAL

## 2022-02-22 ENCOUNTER — ANESTHESIA EVENT (OUTPATIENT)
Dept: GASTROENTEROLOGY | Facility: HOSPITAL | Age: 47
End: 2022-02-22

## 2022-02-22 VITALS
BODY MASS INDEX: 20.9 KG/M2 | TEMPERATURE: 97.8 F | SYSTOLIC BLOOD PRESSURE: 141 MMHG | DIASTOLIC BLOOD PRESSURE: 80 MMHG | WEIGHT: 146 LBS | HEIGHT: 70 IN | OXYGEN SATURATION: 100 % | HEART RATE: 89 BPM | RESPIRATION RATE: 16 BRPM

## 2022-02-22 DIAGNOSIS — R63.4 WEIGHT LOSS: ICD-10-CM

## 2022-02-22 DIAGNOSIS — K63.5 POLYP OF COLON, UNSPECIFIED PART OF COLON, UNSPECIFIED TYPE: ICD-10-CM

## 2022-02-22 LAB — GLUCOSE SERPL-MCNC: 195 MG/DL (ref 65–140)

## 2022-02-22 PROCEDURE — 88305 TISSUE EXAM BY PATHOLOGIST: CPT | Performed by: PATHOLOGY

## 2022-02-22 PROCEDURE — 45380 COLONOSCOPY AND BIOPSY: CPT | Performed by: INTERNAL MEDICINE

## 2022-02-22 PROCEDURE — 82948 REAGENT STRIP/BLOOD GLUCOSE: CPT

## 2022-02-22 RX ORDER — SODIUM CHLORIDE 9 MG/ML
50 INJECTION, SOLUTION INTRAVENOUS CONTINUOUS
Status: DISCONTINUED | OUTPATIENT
Start: 2022-02-22 | End: 2022-02-26 | Stop reason: HOSPADM

## 2022-02-22 RX ORDER — LIDOCAINE HYDROCHLORIDE 10 MG/ML
INJECTION, SOLUTION EPIDURAL; INFILTRATION; INTRACAUDAL; PERINEURAL AS NEEDED
Status: DISCONTINUED | OUTPATIENT
Start: 2022-02-22 | End: 2022-02-22

## 2022-02-22 RX ORDER — PROPOFOL 10 MG/ML
INJECTION, EMULSION INTRAVENOUS AS NEEDED
Status: DISCONTINUED | OUTPATIENT
Start: 2022-02-22 | End: 2022-02-22

## 2022-02-22 RX ORDER — DULAGLUTIDE 1.5 MG/.5ML
1.5 INJECTION, SOLUTION SUBCUTANEOUS WEEKLY
COMMUNITY

## 2022-02-22 RX ADMIN — PROPOFOL 50 MG: 10 INJECTION, EMULSION INTRAVENOUS at 08:26

## 2022-02-22 RX ADMIN — LIDOCAINE HYDROCHLORIDE 50 MG: 10 INJECTION, SOLUTION EPIDURAL; INFILTRATION; INTRACAUDAL at 08:15

## 2022-02-22 RX ADMIN — SODIUM CHLORIDE 50 ML/HR: 0.9 INJECTION, SOLUTION INTRAVENOUS at 07:21

## 2022-02-22 RX ADMIN — PROPOFOL 50 MG: 10 INJECTION, EMULSION INTRAVENOUS at 08:21

## 2022-02-22 RX ADMIN — PROPOFOL 50 MG: 10 INJECTION, EMULSION INTRAVENOUS at 08:17

## 2022-02-22 RX ADMIN — PROPOFOL 50 MG: 10 INJECTION, EMULSION INTRAVENOUS at 08:20

## 2022-02-22 RX ADMIN — PROPOFOL 50 MG: 10 INJECTION, EMULSION INTRAVENOUS at 08:32

## 2022-02-22 RX ADMIN — PROPOFOL 50 MG: 10 INJECTION, EMULSION INTRAVENOUS at 08:18

## 2022-02-22 RX ADMIN — PROPOFOL 100 MG: 10 INJECTION, EMULSION INTRAVENOUS at 08:15

## 2022-02-22 NOTE — H&P
History and Physical -  [facility]  Grabiel Williamson 56 y o  male MRN: [id number]                  HPI: Grabiel Williamson is a 56y o  year old male who presents for weight loss and screening      REVIEW OF SYSTEMS: Per the HPI, and otherwise unremarkable  Historical Information   Past Medical History:   Diagnosis Date    Diabetes mellitus ([address] )     GERD (gastroesophageal reflux disease)     Hypertension      Past Surgical History:   Procedure Laterality Date    COLONOSCOPY      RECTAL SURGERY      fistula    UPPER GASTROINTESTINAL ENDOSCOPY       Social History   Social History     Substance and Sexual Activity   Alcohol Use Yes     Social History     Substance and Sexual Activity   Drug Use Never     Social History     Tobacco Use   Smoking Status Light Tobacco Smoker    Types: Cigars   Smokeless Tobacco Never Used     Family History   Problem Relation Age of Onset    Colon cancer Father        Meds/Allergies     (Not in a hospital admission)      No Known Allergies    Objective     There were no vitals taken for this visit  PHYSICAL EXAMINATION:    General Appearance:   Alert, cooperative, no distress   HEENT:  Normocephalic, atraumatic, anicteric  Neck supple, symmetrical, trachea midline  Lungs:   Equal chest rise and unlabored breathing, normal effort, no coughing  Cardiovascular:   No visualized JVD  Abdomen:   No abdominal distension  Skin:   No jaundice, rashes, or lesions  Musculoskeletal:   Normal range of motion visualized  Psych:  Normal affect and normal insight  Neuro:  Alert and appropriate  ASSESSMENT/PLAN:  This is a 56y o  year old male here for colonoscopy, and he is stable and optimized for his procedure

## 2022-02-22 NOTE — ANESTHESIA PREPROCEDURE EVALUATION
Procedure:  COLONOSCOPY    Relevant Problems   CARDIO   (+) Essential hypertension      ENDO   (+) DM2 (diabetes mellitus, type 2) (HCC) (uncontrolled DM   B sugar 195 mg %)      GI/HEPATIC  Nausea and vomiting       /RENAL (within normal limits)      MUSCULOSKELETAL (within normal limits)      PULMONARY (within normal limits)        Physical Exam    Airway    Mallampati score: II  TM Distance: >3 FB  Neck ROM: full     Dental       Cardiovascular  Cardiovascular exam normal    Pulmonary  Pulmonary exam normal     Other Findings        Anesthesia Plan  ASA Score- 3     Anesthesia Type- IV sedation with anesthesia with ASA Monitors  Additional Monitors:   Airway Plan:           Plan Factors-Exercise tolerance (METS): >4 METS  Chart reviewed  Existing labs reviewed  Patient summary reviewed  Patient is not a current smoker  Patient not instructed to abstain from smoking on day of procedure  Patient did not smoke on day of surgery  Induction-     Postoperative Plan-     Informed Consent- Anesthetic plan and risks discussed with patient and spouse  I personally reviewed this patient with the CRNA  Discussed and agreed on the Anesthesia Plan with the CRNA             Lab Results   Component Value Date    HGBA1C 12 0 (H) 04/16/2020       No results found for: NA, K, CL, CO2, ANIONGAP, BUN, CREATININE, GLUCOSE, GLUF, CALCIUM, CORRECTEDCA, AST, ALT, ALKPHOS, PROT, BILITOT, EGFR    No results found for: WBC, HGB, HCT, MCV, PLT

## 2022-02-22 NOTE — ANESTHESIA POSTPROCEDURE EVALUATION
Post-Op Assessment Note    CV Status:  Stable    Pain management: adequate     Mental Status:  Alert and awake   Hydration Status:  Euvolemic   PONV Controlled:  Controlled   Airway Patency:  Patent      Post Op Vitals Reviewed: Yes      Staff: CRNA         No complications documented      /57 (02/22/22 0843)    Temp      Pulse 88 (02/22/22 0843)   Resp 16 (02/22/22 0843)    SpO2 100 % (02/22/22 0843)

## 2022-03-25 ENCOUNTER — OFFICE VISIT (OUTPATIENT)
Dept: GASTROENTEROLOGY | Facility: CLINIC | Age: 47
End: 2022-03-25
Payer: COMMERCIAL

## 2022-03-25 VITALS
WEIGHT: 149 LBS | SYSTOLIC BLOOD PRESSURE: 112 MMHG | RESPIRATION RATE: 18 BRPM | HEART RATE: 91 BPM | HEIGHT: 70 IN | OXYGEN SATURATION: 97 % | BODY MASS INDEX: 21.33 KG/M2 | DIASTOLIC BLOOD PRESSURE: 70 MMHG | TEMPERATURE: 97.7 F

## 2022-03-25 DIAGNOSIS — R11.2 INTRACTABLE NAUSEA AND VOMITING: Primary | ICD-10-CM

## 2022-03-25 DIAGNOSIS — R63.4 WEIGHT LOSS: ICD-10-CM

## 2022-03-25 PROCEDURE — 99214 OFFICE O/P EST MOD 30 MIN: CPT | Performed by: INTERNAL MEDICINE

## 2022-03-25 NOTE — PROGRESS NOTES
[facility] - Outpatient Follow-up Note  Mike Gonzalez 56 y o  male MRN: [id number]  Encounter: [id number]          ASSESSMENT AND PLAN:      1  Intractable nausea and vomiting  2  Epigastric pain  3  Family history of colon cancer  4  Poorly controlled diabetes  5  Weight loss (35 lb in the last 8 months)    We discussed that his symptoms could be secondary to gastroparesis  He is scheduled for gastric emptying study on March 29, 2022  Increased the dose of Reglan to 5 mg 3 times a day before breakfast, lunch and dinner  Eat small and frequent diet  Low-fat and low-fiber diet  Continue famotidine  Close follow-up with endocrinologist to improve blood glucose control  Most recent A1c 12 3  Further recommendation based on gastric emptying study  He requested he if he can get FMLA -I explained to the patient that given the fact that he has an ongoing vomiting, decreased p o  Intake and dizziness he may need some time off from work  I asked him to discuss this with his primary care doctor         ______________________________________________________________________    SUBJECTIVE:  43-year-old male with complaints of nausea, vomiting, weight loss and epigastric abdominal pain here for follow-up visit  He had history of gluteal abscess requiring incision and drainage  We reviewed his EGD and colonoscopy  His EGD was unremarkable  Colonoscopy showed 1 small polyp but the prep was suboptimal   Although the prep was suboptimal it was adequate to rule out large lesion and colitis  It was recommended that he repeat colonoscopy in 1 year with 2 days bowel prep  Biopsy from esophagus, stomach and duodenum were normal for eosinophilic esophagitis, H pylori infection and celiac disease  Normal TI biopsy  The polyp removed from the colon was sessile serrated adenoma    He had 3 CT scans done through [facility] -which were unremarkable for intra-abdominal process  He is scheduled for gastric emptying study  [facility] that has diary, broth soup  Father and grandfather had colon cancer    Lost 35lbs since sept 2021      REVIEW OF SYSTEMS IS OTHERWISE NEGATIVE  Historical Information   Past Medical History:   Diagnosis Date    Diabetes mellitus ([address] )     GERD (gastroesophageal reflux disease)     Hypertension      Past Surgical History:   Procedure Laterality Date    COLONOSCOPY      RECTAL SURGERY      fistula    UPPER GASTROINTESTINAL ENDOSCOPY       Social History   Social History     Substance and Sexual Activity   Alcohol Use Yes     Social History     Substance and Sexual Activity   Drug Use Never     Social History     Tobacco Use   Smoking Status Light Tobacco Smoker    Types: Cigars   Smokeless Tobacco Never Used     Family History   Problem Relation Age of Onset    Colon cancer Father        Meds/Allergies       Current Outpatient Medications:     Dapagliflozin-metFORMIN HCl ER (Xigduo XR) 5-1000 MG TB24    Dulaglutide (Trulicity) 1 5 TR/5 5CZ SOPN    famotidine (PEPCID) 40 MG tablet    losartan (COZAAR) 100 MG tablet    metoclopramide (REGLAN) 5 mg tablet    pioglitazone (ACTOS) 15 mg tablet    sildenafil (VIAGRA) 50 MG tablet    sucralfate (CARAFATE) 1 g tablet    omeprazole (PriLOSEC) 40 MG capsule    scopolamine (TRANSDERM-SCOP) 1 5 mg/3 days TD 72 hr patch    No Known Allergies        Objective     Blood pressure 112/70, pulse 91, temperature 97 7 °F (36 5 °C), temperature source Tympanic, resp  rate 18, height 5' 10" (1 778 m), weight 67 6 kg (149 lb), SpO2 97 %  Body mass index is 21 38 kg/m²  PHYSICAL EXAM:      General Appearance:   Alert, cooperative, no distress   HEENT:   Normocephalic, atraumatic, anicteric      Neck:  Supple, symmetrical, trachea midline   Lungs:   Clear to auscultation bilaterally; no rales, rhonchi or wheezing; respirations unlabored    Heart[de-identified]   Regular rate and rhythm; no murmur, rub, or gallop     Abdomen:   Soft, non-tender, non-distended; normal bowel sounds; no masses, no organomegaly    Genitalia:   Deferred    Rectal:   Deferred    Extremities:  No cyanosis, clubbing or edema    Pulses:  2+ and symmetric    Skin:  No jaundice, rashes, or lesions    Lymph nodes:  No palpable cervical lymphadenopathy        Lab Results:   No visits with results within 1 Day(s) from this visit  Latest known visit with results is:   Hospital Outpatient Visit on 02/22/2022   Component Date Value    POC Glucose 02/22/2022 195*    Case Report 02/22/2022                      Value:Surgical Pathology Report                         Case: [id number]                                   Authorizing Provider:  Charles Penn MD    Collected:           02/22/2022 0826              Ordering Location:     [facility] Received:            02/22/2022 1346                                     Heart Endoscopy                                                              Pathologist:           Sebastian Farnsworth MD                                                       Specimen:    Polyp, Colorectal, cecal polyp-cold forcep                                                 Final Diagnosis 02/22/2022                      Value: This result contains rich text formatting which cannot be displayed here   Note 02/22/2022                      Value: This result contains rich text formatting which cannot be displayed here   Additional Information 02/22/2022                      Value: This result contains rich text formatting which cannot be displayed here   Synoptic Checklist 02/22/2022                      Value:                            COLON/RECTUM POLYP FORM - GI - All Specimens                                                                                     :    Serrated Adenoma      Gross Description 02/22/2022                      Value: This result contains rich text formatting which cannot be displayed here           Radiology Results:   No results found      Answers for HPI/ROS submitted by the patient on 3/18/2022  Chronicity: recurrent  Onset: more than 1 year ago  Onset quality: gradual  Frequency: every several days  Episode duration: 2 hours  Progression since onset: waxing and waning  Pain location: RUQ  Pain - numeric: 4/10  Pain quality: burning  Radiates to: RUQ  anorexia: Yes  arthralgias: Yes  belching: Yes  constipation: Yes  diarrhea: Yes  dysuria: No  fever: No  flatus: No  frequency: No  headaches: No  hematochezia: No  hematuria: No  melena: No  myalgias: Yes  nausea: Yes  weight loss: Yes  vomiting: Yes  Aggravated by: eating, vomiting  Relieved by: belching  Diagnostic workup: lower endoscopy

## 2022-03-29 ENCOUNTER — HOSPITAL ENCOUNTER (OUTPATIENT)
Dept: RADIOLOGY | Facility: HOSPITAL | Age: 47
Discharge: HOME | End: 2022-03-29
Payer: COMMERCIAL

## 2022-03-29 DIAGNOSIS — E11.9 TYPE 2 DIABETES MELLITUS WITHOUT COMPLICATION, WITHOUT LONG-TERM CURRENT USE OF INSULIN (HCC): ICD-10-CM

## 2022-03-29 DIAGNOSIS — R63.4 WEIGHT LOSS: ICD-10-CM

## 2022-03-29 DIAGNOSIS — R10.13 EPIGASTRIC PAIN: ICD-10-CM

## 2022-03-29 DIAGNOSIS — R11.2 NAUSEA AND VOMITING, INTRACTABILITY OF VOMITING NOT SPECIFIED, UNSPECIFIED VOMITING TYPE: ICD-10-CM

## 2022-03-29 PROCEDURE — G1004 CDSM NDSC: HCPCS

## 2022-03-29 PROCEDURE — 78264 GASTRIC EMPTYING IMG STUDY: CPT

## 2022-03-29 PROCEDURE — A9541 TC99M SULFUR COLLOID: HCPCS

## 2022-04-06 ENCOUNTER — HOSPITAL ENCOUNTER (OUTPATIENT)
Dept: CT IMAGING | Facility: HOSPITAL | Age: 47
Discharge: HOME | End: 2022-04-06
Attending: INTERNAL MEDICINE
Payer: COMMERCIAL

## 2022-04-06 DIAGNOSIS — R63.4 WEIGHT LOSS: ICD-10-CM

## 2022-04-06 PROCEDURE — 71260 CT THORAX DX C+: CPT

## 2022-04-06 PROCEDURE — 74177 CT ABD & PELVIS W/CONTRAST: CPT

## 2022-04-06 RX ADMIN — IOHEXOL 100 ML: 350 INJECTION, SOLUTION INTRAVENOUS at 15:46

## 2022-04-08 ENCOUNTER — TELEPHONE (OUTPATIENT)
Dept: OTHER | Facility: OTHER | Age: 47
End: 2022-04-08

## 2022-04-11 ENCOUNTER — TELEPHONE (OUTPATIENT)
Dept: GASTROENTEROLOGY | Facility: CLINIC | Age: 47
End: 2022-04-11

## 2022-04-13 ENCOUNTER — OFFICE VISIT (OUTPATIENT)
Dept: GASTROENTEROLOGY | Facility: CLINIC | Age: 47
End: 2022-04-13
Payer: COMMERCIAL

## 2022-04-13 VITALS
TEMPERATURE: 97.8 F | HEART RATE: 88 BPM | DIASTOLIC BLOOD PRESSURE: 78 MMHG | BODY MASS INDEX: 22.19 KG/M2 | WEIGHT: 155 LBS | OXYGEN SATURATION: 98 % | HEIGHT: 70 IN | RESPIRATION RATE: 18 BRPM | SYSTOLIC BLOOD PRESSURE: 122 MMHG

## 2022-04-13 DIAGNOSIS — K31.84 GASTROPARESIS DUE TO DM (HCC): Primary | ICD-10-CM

## 2022-04-13 DIAGNOSIS — E11.43 GASTROPARESIS DUE TO DM (HCC): Primary | ICD-10-CM

## 2022-04-13 DIAGNOSIS — K50.10 CROHN'S DISEASE OF LARGE INTESTINE WITHOUT COMPLICATIONS (HCC): ICD-10-CM

## 2022-04-13 PROCEDURE — 99214 OFFICE O/P EST MOD 30 MIN: CPT | Performed by: INTERNAL MEDICINE

## 2022-04-13 NOTE — PROGRESS NOTES
[facility] - Outpatient Follow-up Note  Shaheed Arevalo 56 y o  male MRN: [id number]  Encounter: [id number]          ASSESSMENT AND PLAN:      1  Gastroparesis due to DM (HCC)  Gastric emptying at 4 hours = 37% ( N=90%)  He had significant weight loss  Her early satiety and generalized weakness and fatigue  He could not work at this time  Rick Nim to go back to work once his gastroparesis symptoms improved  He reports some improvement to low-dose Reglan  Continue low-fat and low-fiber diet  Six meals a day  Follow-up with primary care doctor Endocrinology for management of diabetes  His hemoglobin A1c is improving  Previously greater than 12 now down to 10 7  Discontinue Reglan once domperidone is available    We discussed further management options including domperidone, Botox injection to the pylorus, laparoscopic pyloromyotomy and G-POEM  At this point he is interested to proceed with treatment with domperidone  Domperidone order, EKG and labs ordered per protocol  He has baseline labs are completed  I reviewed his CMP and CBC  He has normal CBC, creatinine and electrolytes  No need to repeat baseline labs  He needs new EKG  Detail order per protocol as outlined below  - Informed consent was obtained   - 12- lead EKG obtained   o Assessment Immediately After Initiation of Domperidone:   o In all patients, a 12-Lead EKG will be obtained 3 to 7 days after domperidone is started  o Timing of the EKG will be 1 hour after the first domperidone dose of the day in which the EKG is done  o Patients with clinically significant changes in EKGs from baseline will be followed up with a repeat EKG  o Routine EKG Monitoring on a Stable Dose of Domperidone: ?  In all patients, obtain an EKG at an every 2-month visit for the first year, and then at an every 6-month visit thereafter    - CBC, hepatic function test, and renal panel  - Discussed risk, benefits and adverse reaction including sudden cardiac death  - Reviewed drug interaction   - Must follow every 2 months first year and every 6 months after that  - Must discuss before starting any new medication with our nurse to rule out interaction with domperidone  Patient and his wife verbalized understanding and agreed to procedure and with the plan  ______________________________________________________________________    SUBJECTIVE:  52-year-old male with severe gastroparesis here for follow-up visit  He came in the office today to discuss further management options including domperidone  He has severe gastroparesis with persistent nausea, vomiting, dehydration and generalized fatigue  Gastric emptying study showed 37% emptying at 4 hours  CT scan of chest and abdomen were unremarkable    REVIEW OF SYSTEMS IS OTHERWISE NEGATIVE        Historical Information   Past Medical History:   Diagnosis Date    Diabetes mellitus ([address] )     GERD (gastroesophageal reflux disease)     Hypertension      Past Surgical History:   Procedure Laterality Date    COLONOSCOPY      RECTAL SURGERY      fistula    UPPER GASTROINTESTINAL ENDOSCOPY       Social History   Social History     Substance and Sexual Activity   Alcohol Use Yes     Social History     Substance and Sexual Activity   Drug Use Never     Social History     Tobacco Use   Smoking Status Light Tobacco Smoker    Types: Cigars   Smokeless Tobacco Never Used     Family History   Problem Relation Age of Onset    Colon cancer Father        Meds/Allergies       Current Outpatient Medications:     Dapagliflozin-metFORMIN HCl ER (Xigduo XR) 5-1000 MG TB24    Dulaglutide (Trulicity) 1 5 JS/8 1RO SOPN    famotidine (PEPCID) 40 MG tablet    losartan (COZAAR) 100 MG tablet    metoclopramide (REGLAN) 5 mg tablet    pioglitazone (ACTOS) 15 mg tablet    sildenafil (VIAGRA) 50 MG tablet    omeprazole (PriLOSEC) 40 MG capsule    scopolamine (TRANSDERM-SCOP) 1 5 mg/3 days TD 72 hr patch   sucralfate (CARAFATE) 1 g tablet    No Known Allergies        Objective     Blood pressure 122/78, pulse 88, temperature 97 8 °F (36 6 °C), temperature source Tympanic, resp  rate 18, height 5' 10" (1 778 m), weight 70 3 kg (155 lb), SpO2 98 %  Body mass index is 22 24 kg/m²  PHYSICAL EXAM:      General Appearance:   Alert, cooperative, no distress   HEENT:   Normocephalic, atraumatic, anicteric      Neck:  Supple, symmetrical, trachea midline   Lungs:   Clear to auscultation bilaterally; no rales, rhonchi or wheezing; respirations unlabored    Heart[de-identified]   Regular rate and rhythm; no murmur, rub, or gallop  Abdomen:   Soft, non-tender, non-distended; normal bowel sounds; no masses, no organomegaly    Genitalia:   Deferred    Rectal:   Deferred    Extremities:  No cyanosis, clubbing or edema    Pulses:  2+ and symmetric    Skin:  No jaundice, rashes, or lesions    Lymph nodes:  No palpable cervical lymphadenopathy        Lab Results:   No visits with results within 1 Day(s) from this visit  Latest known visit with results is:   Hospital Outpatient Visit on 02/22/2022   Component Date Value    POC Glucose 02/22/2022 195*    Case Report 02/22/2022                      Value:Surgical Pathology Report                         Case: [id number]                                   Authorizing Provider:  Saige Mckee MD    Collected:           02/22/2022 0826              Ordering Location:     [facility] Received:            02/22/2022 1346                                     Heart Endoscopy                                                              Pathologist:           Noemy Elise MD                                                       Specimen:    Polyp, Colorectal, cecal polyp-cold forcep                                                 Final Diagnosis 02/22/2022                      Value: This result contains rich text formatting which cannot be displayed here      Note 02/22/2022                      Value: This result contains rich text formatting which cannot be displayed here   Additional Information 02/22/2022                      Value: This result contains rich text formatting which cannot be displayed here   Synoptic Checklist 02/22/2022                      Value:                            COLON/RECTUM POLYP FORM - GI - All Specimens                                                                                     :    Serrated Adenoma      Gross Description 02/22/2022                      Value: This result contains rich text formatting which cannot be displayed here  Radiology Results:   NM gastric emptying    Result Date: 3/29/2022  Narrative: GASTRIC EMPTYING STUDY INDICATION: R11 2: Nausea with vomiting, unspecified R10 13: Epigastric pain R63 4: Abnormal weight loss E11 9: Type 2 diabetes mellitus without complications COMPARISON:  None available TECHNIQUE:   The study was performed following the oral administration of 1 1 mCi Tc-99m sulfur colloid combined with scrambled eggs, as part of a standard meal   Following the meal, one minute anterior and posterior images were obtained immediately and at 0 25 hours, 0 5 hour, 1 hour, 1 5 hour, 2 hour, 3 hour and 4 hour intervals from the time of ingestion  Geometric mean analyses were then performed  FINDINGS: Gastric emptying at 0 5 hours = 5% (N < 30%) Gastric emptying at 1 hour = 9 % (N = 10 - 70%) Gastric emptying at 2 hours = 19 % (N = > 40%) Gastric emptying at 3 hours = 28 % (N = > 70%) Gastric emptying at 4 hours = 37 % (N = >90%) Linear T-1/2 = 323 minutes     Impression: Markedly delayed gastric emptying Workstation performed: [id number]     CT chest abdomen pelvis w contrast    Result Date: 4/12/2022  Narrative: CT CHEST, ABDOMEN AND PELVIS WITH IV CONTRAST INDICATION:   R63 4: Abnormal weight loss  COMPARISON:  None  TECHNIQUE: CT examination of the chest, abdomen and pelvis was performed  Axial, sagittal, and coronal 2D reformatted images were created from the source data and submitted for interpretation  Radiation dose length product (DLP) for this visit:  290 mGy-cm   This examination, like all CT scans performed in the [facility], was performed utilizing techniques to minimize radiation dose exposure, including the use of iterative reconstruction and automated exposure control  IV Contrast:  100 mL of iohexol (OMNIPAQUE) Enteric Contrast: Enteric contrast was not administered  FINDINGS: CHEST LUNGS:  Lungs are clear  There is no tracheal or endobronchial lesion  PLEURA:  Unremarkable  HEART/GREAT VESSELS: Heart is unremarkable for patient's age  No thoracic aortic aneurysm  MEDIASTINUM AND SANTY:  Unremarkable  CHEST WALL AND LOWER NECK: Several bilateral hypodense nodules, largest measuring 1 3 cm on the left  Incidental discovery of one or more thyroid nodule(s) measuring less than 1 5 cm and without suspicious features is noted in this patient who is above 28years old; according to guidelines published in the February 2015 white paper on incidental thyroid nodules in the Journal of the [employer] of Radiology [facility]), no further evaluation is recommended  ABDOMEN LIVER/BILIARY TREE:  Unremarkable  GALLBLADDER:  No calcified gallstones  No pericholecystic inflammatory change  SPLEEN:  Unremarkable  PANCREAS:  Unremarkable  ADRENAL GLANDS:  Unremarkable  KIDNEYS/URETERS:  Unremarkable  No hydronephrosis  STOMACH AND BOWEL:  Unremarkable  APPENDIX:  No findings to suggest appendicitis  ABDOMINOPELVIC CAVITY:  No ascites  No pneumoperitoneum  No lymphadenopathy  VESSELS:  Unremarkable for patient's age  PELVIS REPRODUCTIVE ORGANS:  The prostate is mildly enlarged  URINARY BLADDER:  Unremarkable  ABDOMINAL WALL/INGUINAL REGIONS:  Unremarkable  OSSEOUS STRUCTURES:  No acute fracture or destructive osseous lesion  Impression: No etiology for weight loss identified   Mild prostatomegaly   Workstation performed: [id number]

## 2022-04-13 NOTE — PATIENT INSTRUCTIONS
Your prescription for domperidone will be sent to pharmacy in [facility]  Please complete EKG now and 3-7 days after you start taking domperidone  I will see you back in the office in 2 months    Her when you to complete blood work and another EKG prior to your visit

## 2022-04-18 ENCOUNTER — TELEPHONE (OUTPATIENT)
Dept: GASTROENTEROLOGY | Facility: CLINIC | Age: 47
End: 2022-04-18

## 2022-04-18 NOTE — TELEPHONE ENCOUNTER
Called pt regarding domperidone medication  Advised to complete baseline EKG and then office will send script to pharmacy  Reviewed domperidone protocol    pt verbalized understanding of instructions and had no further questions

## 2022-05-03 NOTE — TELEPHONE ENCOUNTER
Received phone call from pt stating he completed EKG in ER at [facility] on 4/29  Viewed EKG through care everywhere and QTC is 503 HR 99 NSR RBBB  QTC is outside FDA guidelines  Provider: Do you want pt to repeat EKG?

## 2022-05-09 NOTE — TELEPHONE ENCOUNTER
LVM informing Dr Maddie Connor is unavailable until Thursday and will f/u regarding EKG/next steps for domperdone then   Advised to call office if has further questions

## 2022-05-17 NOTE — TELEPHONE ENCOUNTER
Discussed EKG results with Dr Umang Nicholson through [facility]  Informed pt is unable to start domperidone due to EKG results and further recs will be discussed at next OV  LVM for pt informing of provider recs and to call office if has further questions

## 2022-06-14 ENCOUNTER — OFFICE VISIT (OUTPATIENT)
Dept: GASTROENTEROLOGY | Facility: CLINIC | Age: 47
End: 2022-06-14
Payer: COMMERCIAL

## 2022-06-14 VITALS
DIASTOLIC BLOOD PRESSURE: 80 MMHG | RESPIRATION RATE: 18 BRPM | WEIGHT: 158 LBS | TEMPERATURE: 97.7 F | SYSTOLIC BLOOD PRESSURE: 128 MMHG | HEART RATE: 90 BPM | HEIGHT: 70 IN | BODY MASS INDEX: 22.62 KG/M2 | OXYGEN SATURATION: 98 %

## 2022-06-14 DIAGNOSIS — Z79.4 TYPE 2 DIABETES MELLITUS WITH KETOACIDOSIS AND COMA, WITH LONG-TERM CURRENT USE OF INSULIN (HCC): ICD-10-CM

## 2022-06-14 DIAGNOSIS — E11.11 TYPE 2 DIABETES MELLITUS WITH KETOACIDOSIS AND COMA, WITH LONG-TERM CURRENT USE OF INSULIN (HCC): ICD-10-CM

## 2022-06-14 DIAGNOSIS — K31.84 GASTROPARESIS: Primary | ICD-10-CM

## 2022-06-14 PROCEDURE — 99214 OFFICE O/P EST MOD 30 MIN: CPT | Performed by: INTERNAL MEDICINE

## 2022-06-14 RX ORDER — AMLODIPINE BESYLATE 10 MG/1
TABLET ORAL
COMMUNITY
Start: 2022-03-26

## 2022-06-14 RX ORDER — ASPIRIN 81 MG
81 TABLET,CHEWABLE ORAL 2 TIMES DAILY
COMMUNITY
Start: 2022-05-18

## 2022-06-14 RX ORDER — INSULIN GLARGINE 300 U/ML
20 INJECTION, SOLUTION SUBCUTANEOUS DAILY
COMMUNITY
Start: 2022-04-09

## 2022-06-14 RX ORDER — OMEPRAZOLE 20 MG/1
20 CAPSULE, DELAYED RELEASE ORAL DAILY
Qty: 60 CAPSULE | Refills: 5 | Status: SHIPPED | OUTPATIENT
Start: 2022-06-14

## 2022-06-14 NOTE — PROGRESS NOTES
[facility] - Outpatient Follow-up Note  Alyssa Rizo 56 y o  male MRN: [id number]  Encounter: [id number]          ASSESSMENT AND PLAN:      1  Gastroparesis  - his symptoms are improving  -continue omeprazole 20 milligram b i d   -Reglan 5 milligram b i d  as needed  Discussed long-term side effects including extrapyramidal side effects and tardive dyskinesia  He verbalized understanding and wishes to continue taking Reglan  -Not on domperidone secondary to prolonged QTC  -continue low-fiber low-fat diet  -small and frequent diet  - omeprazole (PriLOSEC) 20 mg delayed release capsule; Take 1 capsule (20 mg total) by mouth daily  Dispense: 60 capsule; Refill: 5  We discussed other none medical treatment options for gastroparesis but he is not interested at this time  2  Type 2 diabetes mellitus with ketoacidosis and coma, with long-term current use of insulin ([facility] )  Follow-up with primary care and Endocrinology  -now on insulin - this is helping with gastroparesis symptoms  Follow-up in 3 month  ______________________________________________________________________    SUBJECTIVE:  13-year-old male with severe gastroparesis, type 2 diabetes here for follow-up visit  His diabetes was poorly controlled but now on insulin with close monitoring of blood sugar  Most recent hemoglobin A1c from 05/17 is 11  We were planning to start him on domperidone but domperidone is contraindicated due to prolonged QTC greater than 450,  He is on omeprazole and Reglan 5 milligram b i d  He reports some improvement in his symptoms  Reports nausea but last vomiting was over 2 weeks ago  His weight is stable  He is eating small and frequent diet        REVIEW OF SYSTEMS IS OTHERWISE NEGATIVE        Historical Information   Past Medical History:   Diagnosis Date    Diabetes mellitus ([address] )     GERD (gastroesophageal reflux disease)     Hypertension      Past Surgical History:   Procedure Laterality Date  COLONOSCOPY      RECTAL SURGERY      fistula    UPPER GASTROINTESTINAL ENDOSCOPY       Social History   Social History     Substance and Sexual Activity   Alcohol Use Yes     Social History     Substance and Sexual Activity   Drug Use Never     Social History     Tobacco Use   Smoking Status Light Tobacco Smoker    Types: Cigars   Smokeless Tobacco Never Used     Family History   Problem Relation Age of Onset    Colon cancer Father        Meds/Allergies       Current Outpatient Medications:     amLODIPine (NORVASC) 10 mg tablet    Aspirin Low Dose 81 MG chewable tablet    famotidine (PEPCID) 40 MG tablet    insulin glargine (Toujeo SoloStar) 300 units/mL CONCENTRATED U-300 injection pen (1-unit dial)    losartan (COZAAR) 100 MG tablet    metoclopramide (REGLAN) 5 mg tablet    pioglitazone (ACTOS) 15 mg tablet    sildenafil (VIAGRA) 50 MG tablet    Dapagliflozin-metFORMIN HCl ER (Xigduo XR) 5-1000 MG TB24    Dulaglutide (Trulicity) 1 5 WV/5 1HZ SOPN    omeprazole (PriLOSEC) 40 MG capsule    scopolamine (TRANSDERM-SCOP) 1 5 mg/3 days TD 72 hr patch    sucralfate (CARAFATE) 1 g tablet    No Known Allergies        Objective     Blood pressure 128/80, pulse 90, temperature 97 7 °F (36 5 °C), temperature source Tympanic, resp  rate 18, height 5' 10" (1 778 m), weight 71 7 kg (158 lb), SpO2 98 %  Body mass index is 22 67 kg/m²  PHYSICAL EXAM:      General Appearance:   Alert, cooperative, no distress   HEENT:   Normocephalic, atraumatic, anicteric      Neck:  Supple, symmetrical, trachea midline   Lungs:   Clear to auscultation bilaterally; no rales, rhonchi or wheezing; respirations unlabored    Heart[de-identified]   Regular rate and rhythm; no murmur, rub, or gallop     Abdomen:   Soft, non-tender, non-distended; normal bowel sounds; no masses, no organomegaly    Genitalia:   Deferred    Rectal:   Deferred    Extremities:  No cyanosis, clubbing or edema    Pulses:  2+ and symmetric    Skin:  No jaundice, rashes, or lesions    Lymph nodes:  No palpable cervical lymphadenopathy        Lab Results:   No visits with results within 1 Day(s) from this visit  Latest known visit with results is:   Hospital Outpatient Visit on 02/22/2022   Component Date Value    POC Glucose 02/22/2022 195 (A)    Case Report 02/22/2022                      Value:Surgical Pathology Report                         Case: [id number]                                   Authorizing Provider:  Juju Wheeler MD    Collected:           02/22/2022 0826              Ordering Location:     [facility] Received:            02/22/2022 1346                                     Heart Endoscopy                                                              Pathologist:           Pee Christianson MD                                                       Specimen:    Polyp, Colorectal, cecal polyp-cold forcep                                                 Final Diagnosis 02/22/2022                      Value: This result contains rich text formatting which cannot be displayed here   Note 02/22/2022                      Value: This result contains rich text formatting which cannot be displayed here   Additional Information 02/22/2022                      Value: This result contains rich text formatting which cannot be displayed here   Synoptic Checklist 02/22/2022                      Value:                            COLON/RECTUM POLYP FORM - GI - All Specimens                                                                                     :    Serrated Adenoma      Gross Description 02/22/2022                      Value: This result contains rich text formatting which cannot be displayed here  Radiology Results:   No results found

## 2022-08-31 ENCOUNTER — OFFICE VISIT (OUTPATIENT)
Dept: GASTROENTEROLOGY | Facility: CLINIC | Age: 47
End: 2022-08-31
Payer: COMMERCIAL

## 2022-08-31 VITALS
TEMPERATURE: 97.8 F | WEIGHT: 149.8 LBS | DIASTOLIC BLOOD PRESSURE: 90 MMHG | BODY MASS INDEX: 21.45 KG/M2 | HEIGHT: 70 IN | SYSTOLIC BLOOD PRESSURE: 158 MMHG

## 2022-08-31 DIAGNOSIS — K31.84 GASTROPARESIS: Primary | ICD-10-CM

## 2022-08-31 DIAGNOSIS — R11.2 NAUSEA AND VOMITING: ICD-10-CM

## 2022-08-31 DIAGNOSIS — E11.65 TYPE 2 DIABETES MELLITUS WITH HYPERGLYCEMIA, WITHOUT LONG-TERM CURRENT USE OF INSULIN (HCC): ICD-10-CM

## 2022-08-31 DIAGNOSIS — R63.4 WEIGHT LOSS: ICD-10-CM

## 2022-08-31 DIAGNOSIS — R10.13 EPIGASTRIC PAIN: ICD-10-CM

## 2022-08-31 DIAGNOSIS — Z86.010 HISTORY OF COLON POLYPS: ICD-10-CM

## 2022-08-31 PROCEDURE — 99214 OFFICE O/P EST MOD 30 MIN: CPT | Performed by: PHYSICIAN ASSISTANT

## 2022-08-31 RX ORDER — METOCLOPRAMIDE 5 MG/1
TABLET ORAL
Qty: 120 TABLET | Refills: 2 | Status: SHIPPED | OUTPATIENT
Start: 2022-08-31

## 2022-08-31 RX ORDER — METOCLOPRAMIDE 5 MG/1
TABLET ORAL
Qty: 120 TABLET | Refills: 2 | OUTPATIENT
Start: 2022-08-31

## 2022-08-31 RX ORDER — OMEPRAZOLE 20 MG/1
20 CAPSULE, DELAYED RELEASE ORAL
Qty: 60 CAPSULE | Refills: 5 | Status: SHIPPED | OUTPATIENT
Start: 2022-08-31 | End: 2022-09-14

## 2022-08-31 RX ORDER — SCOLOPAMINE TRANSDERMAL SYSTEM 1 MG/1
1 PATCH, EXTENDED RELEASE TRANSDERMAL
Qty: 10 PATCH | Refills: 0 | Status: SHIPPED | OUTPATIENT
Start: 2022-08-31 | End: 2022-09-30

## 2022-08-31 NOTE — PROGRESS NOTES
[facility]  Shahrzad Grewal 56 y o  male MRN: [id number]  Encounter: [id number]          ASSESSMENT AND PLAN:      1  Gastroparesis  2  Type 2 diabetes mellitus with hyperglycemia, without long-term current use of insulin (HCC)  3  Nausea and vomiting  4  Epigastric pain  5  Weight loss  He has severe gastroparesis (confirmed by gastric emptying study) in the setting of poorly controlled diabetes  Hemoglobin A1c has improved from 12 3 -> 11 7 -> 10  Follow-up with PCP and Endocrinology for blood sugar management  We are unable to prescribe domperidone due to prolonged QTc  He seems to be doing OK on Reglan, will send refill, but explained this is not a good long term medication due to risk of neurologic side effects, tardive dyskinesia  He was instructed to immediately stop Reglan if these side effects should arise  Sent refill for scopolamine patch every 72 hours  Continue omeprazole 20 mg twice daily  Start Colace 1-2 times daily to prevent constipation  I referred him to see surgeon Dr Vipul Pryor who performs laparoscopic pyloroplasty however I explained he needs to get his A1c down before surgery would be an option  If he continues to lose weight and have nausea and vomiting, we can consider PEG-J for nutrition until he is able to get surgery  Continue low fat, low fiber diet and small frequent meals  Discussed if unable to tolerate liquids or decrease in urination, he should go to the ED    - metoclopramide (REGLAN) 5 mg tablet; Take 1 tablet by mouth 4 times daily 15 minutes before meals and at bedtime  Dispense: 120 tablet; Refill: 2  - omeprazole (PriLOSEC) 20 mg delayed release capsule; Take 1 capsule (20 mg total) by mouth 2 (two) times a day before meals  Dispense: 60 capsule; Refill: 5  - scopolamine (TRANSDERM-SCOP) 1 mg/3 days TD 72 hr patch; Place 1 patch on the skin every third day  Dispense: 10 patch;  Refill: 0  - Ambulatory Referral to General Surgery; Future    6  History of colon polyps  He is due for repeat colonoscopy Feb 2023 due to removal of adenomatous polyp and poor prep on previous colonoscopy  He needs 2 day bowel prep  Follow-up in 2 months  ______________________________________________________________________    SUBJECTIVE:  72-year-old male with poorly controlled type 2 diabetes mellitus (A1c 10) and diabetic gastroparesis presenting for office follow-up  He ran out of his gastroparesis medication about 2 weeks ago  He was doing well up until Saturday when he began with intractable nausea and vomiting  He has been vomiting bile  He also has epigastric pain  He feels weak  He is recovering from knee surgery and is having difficulty participating with rehab  He last saw his endocrinologist back in March who started him on Trulicity  His PCP took him off Trulicity due to concern for GI side effects  He is now on insulin and actually his blood sugar has been better controlled (around 120)  Most recent A1c from 2 weeks ago was 10 (down from 11 7 in May)  REVIEW OF SYSTEMS IS OTHERWISE NEGATIVE        Historical Information   Past Medical History:   Diagnosis Date    Diabetes mellitus ([address] )     GERD (gastroesophageal reflux disease)     Hypertension      Past Surgical History:   Procedure Laterality Date    COLONOSCOPY      RECTAL SURGERY      fistula    UPPER GASTROINTESTINAL ENDOSCOPY       Social History   Social History     Substance and Sexual Activity   Alcohol Use Yes     Social History     Substance and Sexual Activity   Drug Use Never     Social History     Tobacco Use   Smoking Status Light Tobacco Smoker    Types: Cigars   Smokeless Tobacco Never Used     Family History   Problem Relation Age of Onset    Colon cancer Father        Meds/Allergies       Current Outpatient Medications:     amLODIPine (NORVASC) 10 mg tablet    Aspirin Low Dose 81 MG chewable tablet    Dapagliflozin-metFORMIN HCl ER (Xigduo XR) 5-1000 MG TB24    Dulaglutide (Trulicity) 1 5 OU/3 1HF SOPN    insulin glargine (Toujeo SoloStar) 300 units/mL CONCENTRATED U-300 injection pen (1-unit dial)    losartan (COZAAR) 100 MG tablet    metoclopramide (REGLAN) 5 mg tablet    omeprazole (PriLOSEC) 20 mg delayed release capsule    pioglitazone (ACTOS) 15 mg tablet    scopolamine (TRANSDERM-SCOP) 1 5 mg/3 days TD 72 hr patch    sildenafil (VIAGRA) 50 MG tablet    No Known Allergies        Objective     There were no vitals taken for this visit  There is no height or weight on file to calculate BMI  PHYSICAL EXAM:      General Appearance:   Alert, cooperative, no distress   HEENT:   Normocephalic, atraumatic, anicteric      Neck:  Supple, symmetrical, trachea midline   Lungs:   Clear to auscultation bilaterally; no rales, rhonchi or wheezing; respirations unlabored    Heart[de-identified]   Regular rate and rhythm; no murmur, rub, or gallop  Abdomen:   Soft, non-tender, non-distended; normal bowel sounds; no masses, no organomegaly    Genitalia:   Deferred    Rectal:   Deferred    Extremities:  No cyanosis, clubbing or edema    Pulses:  2+ and symmetric    Skin:  No jaundice, rashes, or lesions    Lymph nodes:  No palpable cervical lymphadenopathy        Lab Results:   No visits with results within 1 Day(s) from this visit     Latest known visit with results is:   Hospital Outpatient Visit on 02/22/2022   Component Date Value    POC Glucose 02/22/2022 195 (A)    Case Report 02/22/2022                      Value:Surgical Pathology Report                         Case: [id number]                                   Authorizing Provider:  Nolan Villasenor MD    Collected:           02/22/2022 0826              Ordering Location:     [facility] Received:            02/22/2022 1346                                     Heart Endoscopy                                                              Pathologist:           Hieu Walter MD Specimen:    Polyp, Colorectal, cecal polyp-cold forcep                                                 Final Diagnosis 02/22/2022                      Value: This result contains rich text formatting which cannot be displayed here   Note 02/22/2022                      Value: This result contains rich text formatting which cannot be displayed here   Additional Information 02/22/2022                      Value: This result contains rich text formatting which cannot be displayed here   Synoptic Checklist 02/22/2022                      Value:                            COLON/RECTUM POLYP FORM - GI - All Specimens                                                                                     :    Serrated Adenoma      Gross Description 02/22/2022                      Value: This result contains rich text formatting which cannot be displayed here  Radiology Results:   No results found

## 2022-08-31 NOTE — LETTER
August 31, 2022     Renee Thornton MD  [address] [place] [id number]    Patient: Jose Singleton   YOB: 1975   Date of Visit: 8/31/2022       Dear Dr Toni Coates:    Thank you for referring Jose Singleton to me for evaluation  Below are my notes for this consultation  If you have questions, please do not hesitate to call me  I look forward to following your patient along with you  Sincerely,        Kiera Brantley PA-C        CC: No Recipients  Kiera Brantley PA-C  8/31/2022  3:05 PM  Sign when Signing Visit  [facility] - Outpatient Follow-up Note  Jose Singleton 56 y o  male MRN: [id number]  Encounter: [id number]          ASSESSMENT AND PLAN:      1  Gastroparesis  2  Type 2 diabetes mellitus with hyperglycemia, without long-term current use of insulin (HCC)  3  Nausea and vomiting  4  Epigastric pain  5   Weight loss  He has severe gastroparesis (confirmed by gastric emptying study) in the setting of poorly controlled diabetes  Hemoglobin A1c has improved from 12 3 -> 11 7 -> 10  Follow-up with PCP and Endocrinology for blood sugar management  We are unable to prescribe domperidone due to prolonged QTc  He seems to be doing OK on Reglan, will send refill, but explained this is not a good long term medication due to risk of neurologic side effects, tardive dyskinesia  He was instructed to immediately stop Reglan if these side effects should arise  Sent refill for scopolamine patch every 72 hours  Continue omeprazole 20 mg twice daily  Start Colace 1-2 times daily to prevent constipation  I referred him to see surgeon Dr Huyen Dong who performs laparoscopic pyloroplasty however I explained he needs to get his A1c down before surgery would be an option  If he continues to lose weight and have nausea and vomiting, we can consider PEG-J for nutrition until he is able to get surgery  Continue low fat, low fiber diet and small frequent meals  Discussed if unable to tolerate liquids or decrease in urination, he should go to the ED    - metoclopramide (REGLAN) 5 mg tablet; Take 1 tablet by mouth 4 times daily 15 minutes before meals and at bedtime  Dispense: 120 tablet; Refill: 2  - omeprazole (PriLOSEC) 20 mg delayed release capsule; Take 1 capsule (20 mg total) by mouth 2 (two) times a day before meals  Dispense: 60 capsule; Refill: 5  - scopolamine (TRANSDERM-SCOP) 1 mg/3 days TD 72 hr patch; Place 1 patch on the skin every third day  Dispense: 10 patch; Refill: 0  - Ambulatory Referral to General Surgery; Future    6  History of colon polyps  He is due for repeat colonoscopy Feb 2023 due to removal of adenomatous polyp and poor prep on previous colonoscopy  He needs 2 day bowel prep  Follow-up in 2 months  ______________________________________________________________________    SUBJECTIVE:  81-year-old male with poorly controlled type 2 diabetes mellitus (A1c 10) and diabetic gastroparesis presenting for office follow-up  He ran out of his gastroparesis medication about 2 weeks ago  He was doing well up until Saturday when he began with intractable nausea and vomiting  He has been vomiting bile  He also has epigastric pain  He feels weak  He is recovering from knee surgery and is having difficulty participating with rehab  He last saw his endocrinologist back in March who started him on Trulicity  His PCP took him off Trulicity due to concern for GI side effects  He is now on insulin and actually his blood sugar has been better controlled (around 120)  Most recent A1c from 2 weeks ago was 10 (down from 11 7 in May)  REVIEW OF SYSTEMS IS OTHERWISE NEGATIVE        Historical Information   Past Medical History:   Diagnosis Date    Diabetes mellitus ([address] )     GERD (gastroesophageal reflux disease)     Hypertension      Past Surgical History:   Procedure Laterality Date    COLONOSCOPY      RECTAL SURGERY      fistula    UPPER GASTROINTESTINAL ENDOSCOPY       Social History Social History     Substance and Sexual Activity   Alcohol Use Yes     Social History     Substance and Sexual Activity   Drug Use Never     Social History     Tobacco Use   Smoking Status Light Tobacco Smoker    Types: Cigars   Smokeless Tobacco Never Used     Family History   Problem Relation Age of Onset    Colon cancer Father        Meds/Allergies       Current Outpatient Medications:     amLODIPine (NORVASC) 10 mg tablet    Aspirin Low Dose 81 MG chewable tablet    Dapagliflozin-metFORMIN HCl ER (Xigduo XR) 5-1000 MG TB24    Dulaglutide (Trulicity) 1 5 HE/0 1BR SOPN    insulin glargine (Toujeo SoloStar) 300 units/mL CONCENTRATED U-300 injection pen (1-unit dial)    losartan (COZAAR) 100 MG tablet    metoclopramide (REGLAN) 5 mg tablet    omeprazole (PriLOSEC) 20 mg delayed release capsule    pioglitazone (ACTOS) 15 mg tablet    scopolamine (TRANSDERM-SCOP) 1 5 mg/3 days TD 72 hr patch    sildenafil (VIAGRA) 50 MG tablet    No Known Allergies        Objective     There were no vitals taken for this visit  There is no height or weight on file to calculate BMI  PHYSICAL EXAM:      General Appearance:   Alert, cooperative, no distress   HEENT:   Normocephalic, atraumatic, anicteric      Neck:  Supple, symmetrical, trachea midline   Lungs:   Clear to auscultation bilaterally; no rales, rhonchi or wheezing; respirations unlabored    Heart[de-identified]   Regular rate and rhythm; no murmur, rub, or gallop  Abdomen:   Soft, non-tender, non-distended; normal bowel sounds; no masses, no organomegaly    Genitalia:   Deferred    Rectal:   Deferred    Extremities:  No cyanosis, clubbing or edema    Pulses:  2+ and symmetric    Skin:  No jaundice, rashes, or lesions    Lymph nodes:  No palpable cervical lymphadenopathy        Lab Results:   No visits with results within 1 Day(s) from this visit     Latest known visit with results is:   Hospital Outpatient Visit on 02/22/2022   Component Date Value    POC Glucose 02/22/2022 195 (A)    Case Report 02/22/2022                      Value:Surgical Pathology Report                         Case: [id number]                                   Authorizing Provider:  Patricia Justice MD    Collected:           02/22/2022 0826              Ordering Location:     [facility] Received:            02/22/2022 1346                                     [facility]                                                              Pathologist:           Nilsa Sawyer MD                                                       Specimen:    Polyp, Colorectal, cecal polyp-cold forcep                                                 Final Diagnosis 02/22/2022                      Value: This result contains rich text formatting which cannot be displayed here   Note 02/22/2022                      Value: This result contains rich text formatting which cannot be displayed here   Additional Information 02/22/2022                      Value: This result contains rich text formatting which cannot be displayed here   Synoptic Checklist 02/22/2022                      Value:                            COLON/RECTUM POLYP FORM - GI - All Specimens                                                                                     :    Serrated Adenoma      Gross Description 02/22/2022                      Value: This result contains rich text formatting which cannot be displayed here  Radiology Results:   No results found

## 2022-09-14 DIAGNOSIS — K31.84 GASTROPARESIS: ICD-10-CM

## 2022-09-14 RX ORDER — OMEPRAZOLE 20 MG/1
CAPSULE, DELAYED RELEASE ORAL
Qty: 180 CAPSULE | Refills: 2 | Status: SHIPPED | OUTPATIENT
Start: 2022-09-14 | End: 2022-10-27 | Stop reason: DRUGHIGH

## 2022-10-25 ENCOUNTER — TELEPHONE (OUTPATIENT)
Dept: OTHER | Facility: OTHER | Age: 47
End: 2022-10-25

## 2022-10-25 NOTE — TELEPHONE ENCOUNTER
Patient is requesting an urgent call back from Dr Miguelina Dillard  He has been having flare ups and worsening for about a week  He states its impacting his ability to go to work  Patient would not go into details with me  He would like to speak with Dr Miguelina Dillard   Please Pratima Garrett

## 2022-10-27 ENCOUNTER — OFFICE VISIT (OUTPATIENT)
Dept: GASTROENTEROLOGY | Facility: CLINIC | Age: 47
End: 2022-10-27

## 2022-10-27 VITALS — DIASTOLIC BLOOD PRESSURE: 80 MMHG | SYSTOLIC BLOOD PRESSURE: 105 MMHG | TEMPERATURE: 97.2 F

## 2022-10-27 DIAGNOSIS — R11.2 NAUSEA AND VOMITING, UNSPECIFIED VOMITING TYPE: ICD-10-CM

## 2022-10-27 DIAGNOSIS — Z86.010 HISTORY OF COLON POLYPS: ICD-10-CM

## 2022-10-27 DIAGNOSIS — E11.65 TYPE 2 DIABETES MELLITUS WITH HYPERGLYCEMIA, WITHOUT LONG-TERM CURRENT USE OF INSULIN (HCC): ICD-10-CM

## 2022-10-27 DIAGNOSIS — R10.13 EPIGASTRIC PAIN: ICD-10-CM

## 2022-10-27 DIAGNOSIS — K31.84 GASTROPARESIS: Primary | ICD-10-CM

## 2022-10-27 RX ORDER — OMEPRAZOLE 40 MG/1
40 CAPSULE, DELAYED RELEASE ORAL DAILY
Qty: 30 CAPSULE | Refills: 2 | Status: SHIPPED | OUTPATIENT
Start: 2022-10-27

## 2022-10-27 NOTE — LETTER
October 27, 2022     Halima Sullivan MD  [address]    Patient: Izzy Camacho   YOB: 1975   Date of Visit: 10/27/2022       Dear Dr Lazarus Nian:    Thank you for referring Izzy Camacho to me for evaluation  Below are my notes for this consultation  If you have questions, please do not hesitate to call me  I look forward to following your patient along with you  Sincerely,        Ibis Bledsoe PA-C        CC: No Recipients  Ibis Bledsoe PA-C  10/27/2022 12:35 PM  Sign when Signing Visit  [facility] - Outpatient Follow-up Note  Izzy Camacho 56 y o  male MRN: [id number]  Encounter: [id number]          ASSESSMENT AND PLAN:      1  Gastroparesis  2  Epigastric pain  3  Nausea and vomiting, unspecified vomiting type  4   Type 2 diabetes mellitus with hyperglycemia, without long-term current use of insulin (HCC)  He has severe gastroparesis (confirmed by gastric emptying study) in the setting of poorly controlled diabetes  Hemoglobin A1c is improving from 12 3 -> 11 7 -> 10  Follow-up with PCP and Endocrinology   We are unable to prescribe domperidone due to prolonged QTc  He is currently taking Reglan 5 mg 2 times daily, okay to continue for now, but explained this is not a good long term medication due to risk of neurologic side effects, tardive dyskinesia  He was instructed to immediately stop Reglan if these side effects should arise  Sent prescription for omeprazole 40 mg daily 30 minutes before breakfast  Start MiraLax as needed at bedtime  We will refer him to see Dr Natasha Burnett at [facility], the gastroparesis specialist, for consideration of gastric pacemaker  He is not a good candidate for laparoscopic pyloromyotomy at this time given his high A1c  If he continues to lose weight and have nausea and vomiting, we can consider PEG-J for nutrition   Gave detailed handout on room gastroparesis diet and lifestyle recommendations  I explained we will fill out his FMLA paperwork    - Ambulatory Referral to Gastroenterology; Future  - omeprazole (PriLOSEC) 40 MG capsule; Take 1 capsule (40 mg total) by mouth daily Before breakfast  Dispense: 30 capsule; Refill: 2    6  History of colon polyps  He is due for repeat colonoscopy Feb 2023 due to removal of adenomatous polyp and poor prep on previous colonoscopy  He needs 2 day bowel prep  Follow-up in 1-2 months  ______________________________________________________________________    SUBJECTIVE:  81-year-old male with poorly controlled type 2 diabetes mellitus (A1c 10) and diabetic gastroparesis presenting for office follow-up  He describes worsening symptoms of nausea, vomiting, abdominal pain for the past 3-4 weeks  He was doing relatively well in September, able to eat without significant vomiting  He also regained some weight  However now he is dropping weight again  He ranges between 140-150 lbs  He ran out of his metoclopramide about 1 weeks ago  His blood sugar has been better controlled ranging between 120 and 150  He states the Toujeo seems to be working for him  REVIEW OF SYSTEMS IS OTHERWISE NEGATIVE        Historical Information   Past Medical History:   Diagnosis Date   • Diabetes mellitus ([address] )    • GERD (gastroesophageal reflux disease)    • Hypertension      Past Surgical History:   Procedure Laterality Date   • COLONOSCOPY     • KNEE SURGERY     • RECTAL SURGERY      fistula   • UPPER GASTROINTESTINAL ENDOSCOPY       Social History   Social History     Substance and Sexual Activity   Alcohol Use Yes     Social History     Substance and Sexual Activity   Drug Use Never     Social History     Tobacco Use   Smoking Status Light Tobacco Smoker   • Types: Cigars   Smokeless Tobacco Never Used     Family History   Problem Relation Age of Onset   • Colon cancer Father        Meds/Allergies       Current Outpatient Medications:   •  omeprazole (PriLOSEC) 40 MG capsule  •  amLODIPine (NORVASC) 10 mg tablet  •  Aspirin Low Dose 81 MG chewable tablet  •  Dapagliflozin-metFORMIN HCl ER (Xigduo XR) 5-1000 MG TB24  •  Dulaglutide (Trulicity) 1 5 EA/9 8EM SOPN  •  insulin glargine (Toujeo SoloStar) 300 units/mL CONCENTRATED U-300 injection pen (1-unit dial)  •  losartan (COZAAR) 100 MG tablet  •  metoclopramide (REGLAN) 5 mg tablet  •  pioglitazone (ACTOS) 15 mg tablet  •  scopolamine (TRANSDERM-SCOP) 1 mg/3 days TD 72 hr patch  •  sildenafil (VIAGRA) 50 MG tablet    No Known Allergies        Objective     Blood pressure 105/80, temperature (!) 97 2 °F (36 2 °C), temperature source Tympanic  There is no height or weight on file to calculate BMI  PHYSICAL EXAM:      General Appearance:   Alert, cooperative, no distress   HEENT:   Normocephalic, atraumatic, anicteric      Neck:  Supple, symmetrical, trachea midline   Lungs:   Clear to auscultation bilaterally; no rales, rhonchi or wheezing; respirations unlabored    Heart[de-identified]   Regular rate and rhythm; no murmur, rub, or gallop  Abdomen:   Soft, non-tender, non-distended; normal bowel sounds; no masses, no organomegaly    Genitalia:   Deferred    Rectal:   Deferred    Extremities:  No cyanosis, clubbing or edema    Pulses:  2+ and symmetric    Skin:  No jaundice, rashes, or lesions    Lymph nodes:  No palpable cervical lymphadenopathy        Lab Results:   No visits with results within 1 Day(s) from this visit     Latest known visit with results is:   Hospital Outpatient Visit on 02/22/2022   Component Date Value   • POC Glucose 02/22/2022 195 (A)   • Case Report 02/22/2022                      Value:Surgical Pathology Report                         Case: [id number]                                   Authorizing Provider:  Jaydon Alejandro MD    Collected:           02/22/2022 0826              Ordering Location:     [facility] Received:            02/22/2022 1346                                     [facility] Pathologist:           Stella Edwards MD                                                       Specimen:    Polyp, Colorectal, cecal polyp-cold forcep                                                • Final Diagnosis 02/22/2022                      Value: This result contains rich text formatting which cannot be displayed here  • Note 02/22/2022                      Value: This result contains rich text formatting which cannot be displayed here  • Additional Information 02/22/2022                      Value: This result contains rich text formatting which cannot be displayed here  • Synoptic Checklist 02/22/2022                      Value:                            COLON/RECTUM POLYP FORM - GI - All Specimens                                                                                     :    Serrated Adenoma     • Gross Description 02/22/2022                      Value: This result contains rich text formatting which cannot be displayed here  Radiology Results:   No results found

## 2022-10-27 NOTE — PROGRESS NOTES
[facility] - Outpatient Follow-up Note  Nitesh Herrera 56 y o  male MRN: [id number]  Encounter: [id number]          ASSESSMENT AND PLAN:      1  Gastroparesis  2  Epigastric pain  3  Nausea and vomiting, unspecified vomiting type  4  Type 2 diabetes mellitus with hyperglycemia, without long-term current use of insulin (HCC)  He has severe gastroparesis (confirmed by gastric emptying study) in the setting of poorly controlled diabetes  Hemoglobin A1c is improving from 12 3 -> 11 7 -> 10  Follow-up with PCP and Endocrinology   We are unable to prescribe domperidone due to prolonged QTc  He is currently taking Reglan 5 mg 2 times daily, okay to continue for now, but explained this is not a good long term medication due to risk of neurologic side effects, tardive dyskinesia  He was instructed to immediately stop Reglan if these side effects should arise  Sent prescription for omeprazole 40 mg daily 30 minutes before breakfast  Start MiraLax as needed at bedtime  We will refer him to see Dr Bennett Rivera at [facility], the gastroparesis specialist, for consideration of gastric pacemaker  He is not a good candidate for laparoscopic pyloromyotomy at this time given his high A1c  If he continues to lose weight and have nausea and vomiting, we can consider PEG-J for nutrition   Gave detailed handout on room gastroparesis diet and lifestyle recommendations  I explained we will fill out his FMLA paperwork    - Ambulatory Referral to Gastroenterology; Future  - omeprazole (PriLOSEC) 40 MG capsule; Take 1 capsule (40 mg total) by mouth daily Before breakfast  Dispense: 30 capsule; Refill: 2    6  History of colon polyps  He is due for repeat colonoscopy Feb 2023 due to removal of adenomatous polyp and poor prep on previous colonoscopy  He needs 2 day bowel prep      Follow-up in 1-2 months  ______________________________________________________________________    SUBJECTIVE:  32-year-old male with poorly controlled type 2 diabetes mellitus (A1c 10) and diabetic gastroparesis presenting for office follow-up  He describes worsening symptoms of nausea, vomiting, abdominal pain for the past 3-4 weeks  He was doing relatively well in September, able to eat without significant vomiting  He also regained some weight  However now he is dropping weight again  He ranges between 140-150 lbs  He ran out of his metoclopramide about 1 weeks ago  His blood sugar has been better controlled ranging between 120 and 150  He states the Toujeo seems to be working for him  REVIEW OF SYSTEMS IS OTHERWISE NEGATIVE        Historical Information   Past Medical History:   Diagnosis Date   • Diabetes mellitus ([facility] [address] )    • GERD (gastroesophageal reflux disease)    • Hypertension      Past Surgical History:   Procedure Laterality Date   • COLONOSCOPY     • KNEE SURGERY     • RECTAL SURGERY      fistula   • UPPER GASTROINTESTINAL ENDOSCOPY       Social History   Social History     Substance and Sexual Activity   Alcohol Use Yes     Social History     Substance and Sexual Activity   Drug Use Never     Social History     Tobacco Use   Smoking Status Light Tobacco Smoker   • Types: Cigars   Smokeless Tobacco Never Used     Family History   Problem Relation Age of Onset   • Colon cancer Father        Meds/Allergies       Current Outpatient Medications:   •  omeprazole (PriLOSEC) 40 MG capsule  •  amLODIPine (NORVASC) 10 mg tablet  •  Aspirin Low Dose 81 MG chewable tablet  •  Dapagliflozin-metFORMIN HCl ER (Xigduo XR) 5-1000 MG TB24  •  Dulaglutide (Trulicity) 1 5 XA/2 3YG SOPN  •  insulin glargine (Toujeo SoloStar) 300 units/mL CONCENTRATED U-300 injection pen (1-unit dial)  •  losartan (COZAAR) 100 MG tablet  •  metoclopramide (REGLAN) 5 mg tablet  •  pioglitazone (ACTOS) 15 mg tablet  •  scopolamine (TRANSDERM-SCOP) 1 mg/3 days TD 72 hr patch  •  sildenafil (VIAGRA) 50 MG tablet    No Known Allergies        Objective     Blood pressure 105/80, temperature (!) 97 2 °F (36 2 °C), temperature source Tympanic  There is no height or weight on file to calculate BMI  PHYSICAL EXAM:      General Appearance:   Alert, cooperative, no distress   HEENT:   Normocephalic, atraumatic, anicteric      Neck:  Supple, symmetrical, trachea midline   Lungs:   Clear to auscultation bilaterally; no rales, rhonchi or wheezing; respirations unlabored    Heart[de-identified]   Regular rate and rhythm; no murmur, rub, or gallop  Abdomen:   Soft, non-tender, non-distended; normal bowel sounds; no masses, no organomegaly    Genitalia:   Deferred    Rectal:   Deferred    Extremities:  No cyanosis, clubbing or edema    Pulses:  2+ and symmetric    Skin:  No jaundice, rashes, or lesions    Lymph nodes:  No palpable cervical lymphadenopathy        Lab Results:   No visits with results within 1 Day(s) from this visit  Latest known visit with results is:   Hospital Outpatient Visit on 02/22/2022   Component Date Value   • POC Glucose 02/22/2022 195 (A)   • Case Report 02/22/2022                      Value:Surgical Pathology Report                         Case: [id number]                                   Authorizing Provider:  Ricky Bond MD    Collected:           02/22/2022 0826              Ordering Location:     [facility] Received:            02/22/2022 1346                                     [facility]                                                              Pathologist:           Reza Hastings MD                                                       Specimen:    Polyp, Colorectal, cecal polyp-cold forcep                                                • Final Diagnosis 02/22/2022                      Value: This result contains rich text formatting which cannot be displayed here  • Note 02/22/2022                      Value: This result contains rich text formatting which cannot be displayed here     • Additional Information 02/22/2022 Value:This result contains rich text formatting which cannot be displayed here  • Synoptic Checklist 02/22/2022                      Value:                            COLON/RECTUM POLYP FORM - GI - All Specimens                                                                                     :    Serrated Adenoma     • Gross Description 02/22/2022                      Value: This result contains rich text formatting which cannot be displayed here  Radiology Results:   No results found

## 2022-11-01 NOTE — TELEPHONE ENCOUNTER
Please let pt know he can take metoclopramide (Reglan) up to 4 times daily (before meals and at bedtime) to help empty his stomach  Thank you

## 2022-12-02 ENCOUNTER — OFFICE VISIT (OUTPATIENT)
Dept: GASTROENTEROLOGY | Facility: CLINIC | Age: 47
End: 2022-12-02

## 2022-12-02 VITALS
WEIGHT: 154 LBS | TEMPERATURE: 97.8 F | SYSTOLIC BLOOD PRESSURE: 116 MMHG | DIASTOLIC BLOOD PRESSURE: 74 MMHG | HEIGHT: 70 IN | BODY MASS INDEX: 22.05 KG/M2

## 2022-12-02 DIAGNOSIS — R10.13 EPIGASTRIC PAIN: ICD-10-CM

## 2022-12-02 DIAGNOSIS — R63.4 WEIGHT LOSS: ICD-10-CM

## 2022-12-02 DIAGNOSIS — K31.84 GASTROPARESIS: Primary | ICD-10-CM

## 2022-12-02 DIAGNOSIS — E11.65 TYPE 2 DIABETES MELLITUS WITH HYPERGLYCEMIA, WITHOUT LONG-TERM CURRENT USE OF INSULIN (HCC): ICD-10-CM

## 2022-12-02 RX ORDER — ATORVASTATIN CALCIUM 10 MG/1
TABLET, FILM COATED ORAL
COMMUNITY
Start: 2022-08-19

## 2022-12-02 RX ORDER — OMEPRAZOLE 40 MG/1
CAPSULE, DELAYED RELEASE ORAL
Qty: 90 CAPSULE | Refills: 3 | Status: SHIPPED | OUTPATIENT
Start: 2022-12-02

## 2022-12-02 NOTE — PROGRESS NOTES
[facility] - Outpatient Follow-up Note  Ana Sandoval 56 y o  male MRN: [id number]  Encounter: [id number]          ASSESSMENT AND PLAN:      1  Gastroparesis  2  Epigastric pain  3  Type 2 diabetes mellitus with hyperglycemia, without long-term current use of insulin ([address] )  4  Weight loss    He has poorly controlled diabetes most recent blood sugar was in 400s  He has severe gastroparesis with significantly decreased p o  intake, dehydration and weight loss  Multiple ER visits for dehydration  She this is significant affecting his quality of life  He is not able to workup this to point  Not a candidate for domperidone due to prolonged QTC and right bundle branch block  Currently taking Reglan 5 mg by mouth 3 to 4 times a day  We discussed about long-term side effects of Reglan including but not limited to neurology side effects such as irreversible tardive dyskinesia  He verbalized understanding and wants to continue Reglan for now  We also discussed room term treatment options as outlined below    Given his significant symptoms I recommend gastrojejunostomy tube placement for feeding and decompression  Patient would like to consider gastrojejunostomy tube placement as a roast resort  He is interested in having pyloroplasty (G-POEM) - he wants to have the procedure done locally  I will follow up with our advanced endoscopist when this procedure will be available at our hospital   He could consider having the procedure done through [facility]     He will follow-up with Dr Fanta Akins regarding gastric electric stimulator placement    In the meantime I recommend eating small and frequent diet  Continue hydration    Boost/Glucerna as tolerated    Check his hemoglobin A1c in preparation for procedure/surgery  ______________________________________________________________________    SUBJECTIVE:  76-year-old male with poorly controlled type 2 diabetes, severe gastroparesis here for follow-up visit  He has significant symptoms from gastroparesis for over 1 year  His symptoms include nausea, vomiting, constipation, excessive fatigue and dehydration from not eating  He reports frequent vomiting and multiple ED visits for dehydration  On EKG he had prolonged QTC and right bundle-branch block  Because of this reason we did not start him on domperidone  He is currently on omeprazole 40 mg daily  I reviewed his recent visit with Dr Curtis Thompson who recommend EGD with Botox injection  And also workup for gastric electric stimulator with pyloroplasty  He is scheduled for EGD with Botox injection on January 24, 2023  Per patient he does not think his insurance is covering Botox injection  The patient and his wife mentioned that they do want to proceed with Botox injection  REVIEW OF SYSTEMS IS OTHERWISE NEGATIVE        Historical Information   Past Medical History:   Diagnosis Date   • Diabetes mellitus ([facility] [address] )    • GERD (gastroesophageal reflux disease)    • Hypertension      Past Surgical History:   Procedure Laterality Date   • COLONOSCOPY     • KNEE SURGERY     • RECTAL SURGERY      fistula   • UPPER GASTROINTESTINAL ENDOSCOPY       Social History   Social History     Substance and Sexual Activity   Alcohol Use Yes     Social History     Substance and Sexual Activity   Drug Use Never     Social History     Tobacco Use   Smoking Status Light Smoker   • Types: Cigars   Smokeless Tobacco Never     Family History   Problem Relation Age of Onset   • Colon cancer Father        Meds/Allergies       Current Outpatient Medications:   •  Aspirin Low Dose 81 MG chewable tablet  •  atorvastatin (LIPITOR) 10 mg tablet  •  insulin glargine (Toujeo SoloStar) 300 units/mL CONCENTRATED U-300 injection pen (1-unit dial)  •  losartan (COZAAR) 100 MG tablet  •  metoclopramide (REGLAN) 5 mg tablet  •  omeprazole (PriLOSEC) 40 MG capsule  •  amLODIPine (NORVASC) 10 mg tablet  • Dapagliflozin-metFORMIN HCl ER (Xigduo XR) 5-1000 MG TB24  •  Dulaglutide (Trulicity) 1 5 XA/8 2KT SOPN  •  pioglitazone (ACTOS) 15 mg tablet  •  scopolamine (TRANSDERM-SCOP) 1 mg/3 days TD 72 hr patch  •  sildenafil (VIAGRA) 50 MG tablet    No Known Allergies        Objective     Blood pressure 116/74, temperature 97 8 °F (36 6 °C), temperature source Tympanic, height 5' 10" (1 778 m), weight 69 9 kg (154 lb)  Body mass index is 22 1 kg/m²  PHYSICAL EXAM:      General Appearance:   Alert, cooperative, no distress   HEENT:   Normocephalic, atraumatic, anicteric      Neck:  Supple, symmetrical, trachea midline   Lungs:   Clear to auscultation bilaterally; no rales, rhonchi or wheezing; respirations unlabored    Heart[de-identified]   Regular rate and rhythm; no murmur, rub, or gallop  Abdomen:   Soft, non-tender, non-distended; normal bowel sounds; no masses, no organomegaly    Genitalia:   Deferred    Rectal:   Deferred    Extremities:  No cyanosis, clubbing or edema    Pulses:  2+ and symmetric    Skin:  No jaundice, rashes, or lesions    Lymph nodes:  No palpable cervical lymphadenopathy        Lab Results:   No visits with results within 1 Day(s) from this visit     Latest known visit with results is:   Hospital Outpatient Visit on 02/22/2022   Component Date Value   • POC Glucose 02/22/2022 195 (H)    • Case Report 02/22/2022                      Value:Surgical Pathology Report                         Case: [id number]                                   Authorizing Provider:  Radha Colbert MD    Collected:           02/22/2022 0826              Ordering Location:     [facility] Received:            02/22/2022 1346                                     Heart Endoscopy                                                              Pathologist:           Chinmay Grace MD                                                       Specimen:    Polyp, Colorectal, cecal polyp-cold forcep • Final Diagnosis 02/22/2022                      Value: This result contains rich text formatting which cannot be displayed here  • Note 02/22/2022                      Value: This result contains rich text formatting which cannot be displayed here  • Additional Information 02/22/2022                      Value: This result contains rich text formatting which cannot be displayed here  • Synoptic Checklist 02/22/2022                      Value:                            COLON/RECTUM POLYP FORM - GI - All Specimens                                                                                     :    Serrated Adenoma     • Gross Description 02/22/2022                      Value: This result contains rich text formatting which cannot be displayed here  Radiology Results:   No results found    Answers for HPI/ROS submitted by the patient on 11/29/2022  When you are not experiencing symptoms of your inflammatory bowel disease, how many bowel movements do you typically have each day?: 0-1  Over the last 3 days, what is the maximum number of bowel movements that you had in a single day?: 0  Over the last 3 days, have you had any bowel movements where you passed blood without stool?: No  Since your last visit, have you received any vaccinations?: No  Since the last visit, have you had an infection?: No  In the past three months, have you used tobacco in any form?: No  During the last year, how many days have you missed work or school because of your inflammatory bowel disease?: 30  During the last year, how many days have you been hospitalized because of your inflammatory bowel disease?: 0  During the last year, how many days have you visited a hospital emergency department because of your inflammatory bowel disease?: 3  During the last month, have you taken narcotic pain medications (such as Percocet, oxycodone, Oxycontin, morphine, Vicodin, Dilaudid, MS Contin) for your inflammatory bowel disease?: No  Have you awoken at night because you needed to move your bowels during the last month? : No  Have you had leakage of stool while sleeping during the last month?: Yes  Have you had leakage of stool while you were awake during the last month?: No  In the last 6 months, have you unintentionally lost weight?: Yes  Fever: Yes  Eye irritation: Yes  Mouth sores: No  Sore throat: Yes  Chest pain: Yes  Shortness of breath: Yes  Numbness or tingling in your hands or feet: Yes  Skin rash: No  Pain or swelling in your joints: Yes  Bruising or bleeding: No  Felt depressed or blue:  Yes

## 2022-12-05 ENCOUNTER — TELEPHONE (OUTPATIENT)
Dept: GASTROENTEROLOGY | Facility: CLINIC | Age: 47
End: 2022-12-05

## 2022-12-05 NOTE — TELEPHONE ENCOUNTER
I left a message with Dr Gwendolyn Portillo office to call back to discuss pt's visit with Dr Gregorio Rene and his recommendations

## 2022-12-11 LAB — HBA1C MFR BLD: 12.1 % OF TOTAL HGB

## 2022-12-13 NOTE — TELEPHONE ENCOUNTER
I spoke with the  at Dr Hiram Toscano office and requested a call back from a nurse to inform Dr Lyndsey Carrion that Dr Liang Fay is requesting that pt's gastric pacemaker placement and pyloroplasty be expedited due to pt being in the ER several times and not doing well

## 2023-01-29 DIAGNOSIS — R10.13 EPIGASTRIC PAIN: ICD-10-CM

## 2023-01-29 RX ORDER — OMEPRAZOLE 40 MG/1
CAPSULE, DELAYED RELEASE ORAL
Qty: 90 CAPSULE | Refills: 4 | Status: SHIPPED | OUTPATIENT
Start: 2023-01-29

## 2023-02-23 DIAGNOSIS — R10.13 EPIGASTRIC PAIN: ICD-10-CM

## 2023-02-23 RX ORDER — OMEPRAZOLE 40 MG/1
CAPSULE, DELAYED RELEASE ORAL
Qty: 90 CAPSULE | Refills: 4 | Status: SHIPPED | OUTPATIENT
Start: 2023-02-23

## 2023-03-01 ENCOUNTER — OFFICE VISIT (OUTPATIENT)
Dept: GASTROENTEROLOGY | Facility: CLINIC | Age: 48
End: 2023-03-01

## 2023-03-01 VITALS
OXYGEN SATURATION: 99 % | HEIGHT: 70 IN | HEART RATE: 86 BPM | SYSTOLIC BLOOD PRESSURE: 150 MMHG | DIASTOLIC BLOOD PRESSURE: 90 MMHG | BODY MASS INDEX: 22.33 KG/M2 | TEMPERATURE: 98.2 F | WEIGHT: 156 LBS

## 2023-03-01 DIAGNOSIS — E11.65 TYPE 2 DIABETES MELLITUS WITH HYPERGLYCEMIA, WITHOUT LONG-TERM CURRENT USE OF INSULIN (HCC): ICD-10-CM

## 2023-03-01 DIAGNOSIS — E13.9 DIABETES 1.5, MANAGED AS TYPE 2 (HCC): ICD-10-CM

## 2023-03-01 DIAGNOSIS — K50.10 CROHN'S DISEASE OF LARGE INTESTINE WITHOUT COMPLICATIONS (HCC): ICD-10-CM

## 2023-03-01 DIAGNOSIS — K61.1 PERIRECTAL ABSCESS: ICD-10-CM

## 2023-03-01 DIAGNOSIS — K31.84 GASTROPARESIS: Primary | ICD-10-CM

## 2023-03-01 RX ORDER — ONDANSETRON 4 MG/1
4 TABLET, FILM COATED ORAL EVERY 8 HOURS PRN
Qty: 30 TABLET | Refills: 2 | Status: SHIPPED | OUTPATIENT
Start: 2023-03-01

## 2023-03-01 NOTE — PROGRESS NOTES
[facility] - Outpatient Follow-up Note  Angelique Velez 53 y o  male MRN: [id number]  Encounter: [id number]          ASSESSMENT AND PLAN:      1  Gastroparesis  He has severe gastroparesis with significant weight loss  Not on domperidone due to prolonged QT  Currently taking Reglan 1-2 times a day as needed  He was recently seen by Dr Ambrocio Stone who did EGD with pyloric dilation using TTS  Dilation was performed to 20 mm  He reports some improvement in his symptoms for 2 weeks but now having another flareup of nausea, vomiting and inability to tolerate p o  Zofran is helping with nausea  I will give him a refill   -His blood glucose is poorly controlled  His hemoglobin A1c remained high at 12  -I recommend referral to endocrinologist for better blood glucose control  -Eat a small and frequent diet  -I also discussed with the patient that he is a good candidate for pyloroplasty ( G-POEM)   -Follow-up with Dr Ambrocio Stone for consideration of gastric stimulator  - ondansetron (ZOFRAN) 4 mg tablet; Take 1 tablet (4 mg total) by mouth every 8 (eight) hours as needed for nausea or vomiting  Dispense: 30 tablet; Refill: 2    2  Crohn's disease of large intestine without complications ([facility] [address] )  3  Perirectal abscess  Stable  no symptom at this time    5  Type 2 diabetes mellitus with hyperglycemia, without long-term current use of insulin ([facility])  Referral to endocrinology    ______________________________________________________________________    SUBJECTIVE: 71-year-old male with poorly controlled type 2 diabetes complicated by severe gastroparesis here for follow-up visit  Because of right bundle arely block with QTc is not a candidate for domperidone  He takes Reglan as needed  He is on omeprazole  Recent evaluation by Dr Ambrocio Stone    Underwent EGD with dilation of pylorus TTS to 20 mm  Botox coverage was denied by his insurance  He reports some improvement in his symptoms after dilation for about 2 weeks and then started having another flare with nausea and vomiting  Today he feels better  His blood sugar is poorly controlled with hemoglobin A1c of 12  He is interested in G POEM and gastric stimulator      REVIEW OF SYSTEMS IS OTHERWISE NEGATIVE  Historical Information   Past Medical History:   Diagnosis Date   • Diabetes mellitus ([address] )    • GERD (gastroesophageal reflux disease)    • Hypertension      Past Surgical History:   Procedure Laterality Date   • COLONOSCOPY     • KNEE SURGERY     • RECTAL SURGERY      fistula   • UPPER GASTROINTESTINAL ENDOSCOPY       Social History   Social History     Substance and Sexual Activity   Alcohol Use Yes     Social History     Substance and Sexual Activity   Drug Use Never     Social History     Tobacco Use   Smoking Status Light Smoker   • Types: Cigars   Smokeless Tobacco Never     Family History   Problem Relation Age of Onset   • Colon cancer Father        Meds/Allergies       Current Outpatient Medications:   •  ondansetron (ZOFRAN) 4 mg tablet  •  amLODIPine (NORVASC) 10 mg tablet  •  Aspirin Low Dose 81 MG chewable tablet  •  atorvastatin (LIPITOR) 10 mg tablet  •  Dapagliflozin-metFORMIN HCl ER (Xigduo XR) 5-1000 MG TB24  •  insulin glargine (Toujeo SoloStar) 300 units/mL CONCENTRATED U-300 injection pen (1-unit dial)  •  losartan (COZAAR) 100 MG tablet  •  metoclopramide (REGLAN) 5 mg tablet  •  omeprazole (PriLOSEC) 40 MG capsule  •  pioglitazone (ACTOS) 15 mg tablet  •  scopolamine (TRANSDERM-SCOP) 1 mg/3 days TD 72 hr patch  •  sildenafil (VIAGRA) 50 MG tablet    No Known Allergies        Objective     Blood pressure 150/90, pulse 86, temperature 98 2 °F (36 8 °C), temperature source Tympanic, height 5' 10" (1 778 m), weight 70 8 kg (156 lb), SpO2 99 %  Body mass index is 22 38 kg/m²        PHYSICAL EXAM:      General Appearance:   Alert, cooperative, no distress   HEENT:   Normocephalic, atraumatic, anicteric      Neck:  Supple, symmetrical, trachea midline   Lungs:   Clear to auscultation bilaterally; no rales, rhonchi or wheezing; respirations unlabored    Heart[de-identified]   Regular rate and rhythm; no murmur, rub, or gallop  Abdomen:   Soft, non-tender, non-distended; normal bowel sounds; no masses, no organomegaly    Genitalia:   Deferred    Rectal:   Deferred    Extremities:  No cyanosis, clubbing or edema    Pulses:  2+ and symmetric    Skin:  No jaundice, rashes, or lesions    Lymph nodes:  No palpable cervical lymphadenopathy        Lab Results:   No visits with results within 1 Day(s) from this visit  Latest known visit with results is:   Orders Only on 12/10/2022   Component Date Value   • Hemoglobin A1C 12/10/2022 12 1 (H)          Radiology Results:   No results found

## 2023-05-22 DIAGNOSIS — K31.84 GASTROPARESIS: ICD-10-CM

## 2023-05-23 RX ORDER — METOCLOPRAMIDE 5 MG/1
TABLET ORAL
Qty: 120 TABLET | Refills: 2 | Status: SHIPPED | OUTPATIENT
Start: 2023-05-23

## 2023-05-25 DIAGNOSIS — R10.13 EPIGASTRIC PAIN: ICD-10-CM

## 2023-05-25 RX ORDER — OMEPRAZOLE 40 MG/1
CAPSULE, DELAYED RELEASE ORAL
Qty: 90 CAPSULE | Refills: 4 | Status: SHIPPED | OUTPATIENT
Start: 2023-05-25

## 2023-09-05 ENCOUNTER — OFFICE VISIT (OUTPATIENT)
Dept: GASTROENTEROLOGY | Facility: CLINIC | Age: 48
End: 2023-09-05
Payer: COMMERCIAL

## 2023-09-05 VITALS
HEIGHT: 70 IN | BODY MASS INDEX: 22.48 KG/M2 | WEIGHT: 157 LBS | SYSTOLIC BLOOD PRESSURE: 106 MMHG | TEMPERATURE: 97.1 F | DIASTOLIC BLOOD PRESSURE: 72 MMHG

## 2023-09-05 DIAGNOSIS — E11.65 TYPE 2 DIABETES MELLITUS WITH HYPERGLYCEMIA, WITHOUT LONG-TERM CURRENT USE OF INSULIN (HCC): ICD-10-CM

## 2023-09-05 DIAGNOSIS — R63.4 WEIGHT LOSS: ICD-10-CM

## 2023-09-05 DIAGNOSIS — K31.84 GASTROPARESIS: Primary | ICD-10-CM

## 2023-09-05 DIAGNOSIS — R11.2 INTRACTABLE NAUSEA AND VOMITING: ICD-10-CM

## 2023-09-05 PROCEDURE — 99214 OFFICE O/P EST MOD 30 MIN: CPT | Performed by: INTERNAL MEDICINE

## 2023-09-05 RX ORDER — LEVOFLOXACIN 750 MG/1
TABLET ORAL
COMMUNITY
Start: 2023-09-04

## 2023-09-05 NOTE — PROGRESS NOTES
[facility] - Outpatient Follow-up Note  Ivonne Lehman 53 y.o. male MRN: [id number]  Encounter: [id number]          ASSESSMENT AND PLAN:    1. Gastroparesis  2. Intractable nausea and vomiting  3. Weight loss  Linear T-1/2 323 minutes  37% empty at the end of 4 hours  He continues to have significant symptoms of nausea, vomiting and early satiety. Had some response to pyloric dilation and Botox injection to the pylorus January 2022. The effect lasted for 2 to 3 months. Continue low-fat and low fiber diet  Small and frequent meals  Continue Zofran and scopolamine for nausea  Reglan on as-needed basis    He has poorly controlled diabetes with hemoglobin A1c of 12  We had extensive discussion about optimizing blood sugar control  I will have him see our advanced endoscopist Dr. Bienvenido Reis to discuss CHUCK ONEAL once his blood sugar is well controlled. He has appointment to see endocrinologist      4. Type 2 diabetes mellitus with hyperglycemia, without long-term current use of insulin ([address])  -Poorly controlled  -Follow-up with endocrinologist    ______________________________________________________________________    SUBJECTIVE: 81-year-old male with poorly controlled type 2 diabetes and severe gastroparesis symptoms here for follow-up visit. He was recently admitted to [facility] for diabetic ketoacidosis. He is here to follow-up on gastroparesis. He has longstanding gastroparesis. Last seen in our office in March 2023. He is main symptoms are intermittent nausea, vomiting and abdominal pain. When he gets a flareup he is not usually able to tolerate p.o. intake for few days. He also follows up with Dr. Adryan Carpio at [facility] [place], operated by [facility].  He had EGD with dilation of the pylorus and Botox injection in January 2022. He reports some improvement in her symptoms for 2 to 3 months. Currently having daily symptoms with flareup of symptoms every 2 to 4 weeks.   Because of his prolonged QTc he is not a candidate for domperidone  He is interested in discussing G POEM  Gastric emptying study on 3/29/2022  Gastric emptying at 0.5 hours = 5% (N < 30%)   Gastric emptying at 1 hour = 9 % (N = 10 - 70%)  Gastric emptying at 2 hours = 19 % (N = > 40%)  Gastric emptying at 3 hours = 28 % (N = > 70%)  Gastric emptying at 4 hours = 37 % (N = >90%)     Linear T-1/2 = 323 minutes          REVIEW OF SYSTEMS IS OTHERWISE NEGATIVE. Historical Information   Past Medical History:   Diagnosis Date   • Diabetes mellitus ([address])    • GERD (gastroesophageal reflux disease)    • Hypertension      Past Surgical History:   Procedure Laterality Date   • COLONOSCOPY     • KNEE SURGERY     • RECTAL SURGERY      fistula   • UPPER GASTROINTESTINAL ENDOSCOPY       Social History   Social History     Substance and Sexual Activity   Alcohol Use Yes     Social History     Substance and Sexual Activity   Drug Use Never     Social History     Tobacco Use   Smoking Status Light Smoker   • Types: Cigars   Smokeless Tobacco Never     Family History   Problem Relation Age of Onset   • Colon cancer Father        Meds/Allergies       Current Outpatient Medications:   •  Aspirin Low Dose 81 MG chewable tablet  •  atorvastatin (LIPITOR) 10 mg tablet  •  insulin glargine (Toujeo SoloStar) 300 units/mL CONCENTRATED U-300 injection pen (1-unit dial)  •  Insulin Lispro w/ Trans Port 100 UNIT/ML SOPN  •  levofloxacin (LEVAQUIN) 750 mg tablet  •  losartan (COZAAR) 100 MG tablet  •  metoclopramide (REGLAN) 5 mg tablet  •  omeprazole (PriLOSEC) 40 MG capsule  •  ondansetron (ZOFRAN) 4 mg tablet  •  amLODIPine (NORVASC) 10 mg tablet  •  Dapagliflozin-metFORMIN HCl ER (Xigduo XR) 5-1000 MG TB24  •  pioglitazone (ACTOS) 15 mg tablet  •  scopolamine (TRANSDERM-SCOP) 1 mg/3 days TD 72 hr patch  •  sildenafil (VIAGRA) 50 MG tablet  No current facility-administered medications for this visit.     No Known Allergies        Objective     Blood pressure 106/72, temperature (!) 97.1 °F (36.2 °C), temperature source Tympanic, height 5' 10" (1.778 m), weight 71.2 kg (157 lb). Body mass index is 22.53 kg/m². PHYSICAL EXAM:      General Appearance:   Alert, cooperative, no distress   HEENT:   Normocephalic, atraumatic, anicteric.     Neck:  Supple, symmetrical, trachea midline   Lungs:   Clear to auscultation bilaterally; no rales, rhonchi or wheezing; respirations unlabored    Heart[de-identified]   Regular rate and rhythm; no murmur, rub, or gallop. Abdomen:   Soft, non-tender, non-distended; normal bowel sounds; no masses, no organomegaly    Genitalia:   Deferred    Rectal:   Deferred    Extremities:  No cyanosis, clubbing or edema    Pulses:  2+ and symmetric    Skin:  No jaundice, rashes, or lesions    Lymph nodes:  No palpable cervical lymphadenopathy        Lab Results:   No visits with results within 1 Day(s) from this visit. Latest known visit with results is:   Orders Only on 12/10/2022   Component Date Value   • Hemoglobin A1C 12/10/2022 12.1 (H)          Radiology Results:   Rich Fregoso W/ DOPPLER COMPLETE    Result Date: 9/2/2023  Narrative: History: Left-sided testicular pain and vomiting Technique: An ultrasound of the scrotum and contents was performed using grayscale ultrasound. Duplex ultrasonography of the scrotal contents was also performed. B-mode two-dimensional vascular structure, Doppler spectral analysis, and color flow Doppler imaging was performed. Comparison: None available Findings: RIGHT testicle: The right testicle measures 4.6 x 2.1 x 2.9 cm. . Testicle demonstrates homogeneous echotexture. No discrete mass. The right epididymal head measures 0.8 x 0.9 cm, increased vascularity throughout the RIGHT epididymis Increased vascularity throughout the RIGHT testicle, with normal arterial and venous blood flow. Small hydrocele, with echogenic debris. LEFT testicle: The left testicle measures 4.4 x 2 x 3.2 cm. . Testicle demonstrates homogeneous echotexture. No discrete mass. The left epididymal head measures 0.9 x 1.2 cm; a subcentimeter epididymal head cyst is noted. Increased vascularity throughout the LEFT testicle, with normal arterial and venous blood flow Small hydrocele with echogenic debris    Impression: Impression: 1.  Bilateral orchitis with RIGHT epididymitis; no evidence for testicular torsion 2.  Small bilateral hydroceles Workstation:[id number]    CTA abdomen pelvis w wo contrast    Result Date: 9/2/2023  Narrative: EXAM: CT ABDOMEN PELVIS W IV CONTRAST ONLY TECHNIQUE: Axial CT imaging was performed through the abdomen and pelvis following 90 mL IV contrast. Sagittal and coronal reformats were performed. CT examination performed with dose lowering protocol in accordance with ALARA. INDICATION: 48 years patient, Vomiting, constipation x 7 days, unable to tolerate PO. COMPARISON(S): CT chest, abdomen, pelvis, 10/12/2021 FINDINGS: LOWER CHEST: Unremarkable. No acute findings. No mass. No pericardial effusion. ABDOMEN/PELVIS: Liver: Normal liver. Gallbladder/Biliary: Normal gallbladder. No definite biliary dilatation.  No intrahepatic ductal dilatation is noted. Pancreas: Unremarkable. No large pancreatic mass, ductal dilatation, inflammatory stranding or fluid collection. Spleen:  Normal size. No mass. Adrenals: Unremarkable. Both adrenal glands are normal in appearance. Kidneys/Ureters/Bladder: No hydronephrosis. Markedly distended bladder is appreciated. Gastrointestinal/Mesentery/Peritoneal cavity: Stomach is normal.   Small bowel has a normal caliber with minimal hyperemia, layering air and fluid. Left side colonic diverticulosis and myochosis coli, without diverticulitis. Moderate retained stool in the colon. The appendix is visualized and appears normal.  No intraperitoneal free air. No ascites. No organized fluid collection.  No pelvic mass. There is enlargement of the prostate gland.  Vascular:Extensive vascular calcifications including calcifications of the vas deferens, in keeping with diabetes. No aortic aneurysm. Lymph Nodes: Unremarkable. No retroperitoneal, mesenteric, pelvic or inguinal lymphadenopathy. Bones/vertebra/soft tissues/Abdominal wall: Normal osseous structures. Normal abdominal wall. Impression: IMPRESSION: 1. Innumerable hyperemia of the small bowel with layering air and fluid, most suggestive of mild enteritis. Moderate constipation, no definite bowel obstruction. 2. Markedly distended bladder is noted. Workstation:[id number]    XR chest portable    Result Date: 9/2/2023  Narrative: History: 81-year-old male with vomiting Comparison studies: 4/27/2023 A single frontal view of the chest was obtained Findings: The lungs are well-expanded. No airspace consolidation, pneumothorax, or pleural effusion. The pulmonary vasculature is normal. The heart is normal in size, and the cardiac and mediastinal contours are normal. No acute osseous abnormality. Impression: Impression: No acute cardiopulmonary disease.  [id number]

## 2023-11-28 ENCOUNTER — OFFICE VISIT (OUTPATIENT)
Dept: GASTROENTEROLOGY | Facility: CLINIC | Age: 48
End: 2023-11-28
Payer: COMMERCIAL

## 2023-11-28 VITALS
WEIGHT: 175 LBS | HEIGHT: 70 IN | BODY MASS INDEX: 25.05 KG/M2 | TEMPERATURE: 97.1 F | DIASTOLIC BLOOD PRESSURE: 84 MMHG | SYSTOLIC BLOOD PRESSURE: 122 MMHG

## 2023-11-28 DIAGNOSIS — K31.84 GASTROPARESIS: Primary | ICD-10-CM

## 2023-11-28 PROCEDURE — 99214 OFFICE O/P EST MOD 30 MIN: CPT | Performed by: INTERNAL MEDICINE

## 2023-11-28 RX ORDER — CEPHALEXIN 250 MG/1
CAPSULE ORAL
COMMUNITY

## 2023-11-28 RX ORDER — PROCHLORPERAZINE MALEATE 10 MG
10 TABLET ORAL EVERY 6 HOURS PRN
Qty: 30 TABLET | Refills: 0 | Status: SHIPPED | OUTPATIENT
Start: 2023-11-28

## 2023-11-28 RX ORDER — BLOOD-GLUCOSE SENSOR
EACH MISCELLANEOUS
COMMUNITY
Start: 2023-10-09

## 2023-11-28 RX ORDER — URINE ACETONE TEST STRIPS
STRIP MISCELLANEOUS
COMMUNITY
Start: 2023-10-11

## 2023-11-28 NOTE — PROGRESS NOTES
[facility] - Outpatient Follow-up Note  Rick Guo 53 y.o. male MRN: [id number]  Encounter: [id number]          ASSESSMENT AND PLAN:      1.  Gastroparesis  Patient presenting currently with symptoms of gastroparesis  His main symptoms are nausea, fullness/early satiety, and distention   Etiology of gastroparesis is probably from diabetes  He is currently on a modified diet and his weight has been stable  GCSI 29  Gastric emptying study has been performed and showed half emptying time was 323 minutes, gastric emptying at 4 hours was 37% which is conclusive of delayed gastric emptying  He was previously seen by Anahi feliz, underwent Botox injection and pyloric balloon dilation  States his symptoms improved for about 3 months after pyloric dilation and Botox injection  He was on reglan but currently not using due to side effects  States compazine is helping with symptoms   We had an extensive discussion about options for endoscopic treatment including Botox injection, per oral endoscopic myotomy, patient was also made aware that surgery and gastric pacemaker are possible option for treatment  We discussed that Botox effects are usually short lived and can make further intervention such as G-POEM more difficult, but positive response after Botox injection generally predicts success after G POEM  Patient was referred to [facility] to Dr Hal Tate  to consider G POEM    I spent 30 minutes in chart review, face-to-face counseling, coordination of care, and documentation   ______________________________________________________________________    SUBJECTIVE: Patient seen and examined, he is a 69-year-old male patient with history of diabetes gastroparesis, he was recently seen by Dr. Collins Gonzalez and referred to me to consider G POEM, he persists with multiple symptoms of gastroparesis, otherwise he denies any recent events, currently is tolerating some p.o. and his weight has been stable, is passing flatus and having bowel movements, no recent weight loss    Gastroparesis Cardinal Symptom Index   Nausea/Vomiting  Nausea  very severe  Retching  very severe  Vomiting very severe    Fullness/Early satiety  Stomach fullness severe  Not able to finish meal severe  Fullness after eating severe  Loss of appetite severe    Bloating/distention  Bloating severe  Belly increase in size  severe    Answers submitted by the patient for this visit:  Inflammatory Bowel Disease (Submitted on 11/27/2023)  When you are not experiencing symptoms of your inflammatory bowel disease, how many bowel movements do you typically have each day?: 2  What is the average (typical) number of bowel movements that you had in a single day during the last week?: 2  Over the last 3 days, have you had any bowel movements where you passed blood without stool?: No  Since your last visit, have you received any vaccinations?: No  Since the last visit, have you had an infection?: No  In the past three months, have you used tobacco in any form?: No  During the last year, how many days have you missed work or school because of your inflammatory bowel disease?: 0  During the last year, how many days have you been hospitalized because of your inflammatory bowel disease?: 0  During the last year, how many days have you visited a hospital emergency department because of your inflammatory bowel disease?: 0  During the last month, have you taken narcotic pain medications (such as Percocet, oxycodone, Oxycontin, morphine, Vicodin, Dilaudid, MS Contin) for your inflammatory bowel disease?: No  Have you awoken at night because you needed to move your bowels during the last month? : Yes  Have you had leakage of stool while sleeping during the last month?: No  Have you had leakage of stool while you were awake during the last month?: No  In the last 6 months, have you unintentionally lost weight?: No  Fever: No  Eye irritation: Yes  Mouth sores: No  Sore throat: No  Chest pain: No  Shortness of breath: No  Numbness or tingling in your hands or feet: Yes  Skin rash: No  Pain or swelling in your joints: Yes  Bruising or bleeding: No  Felt depressed or blue: Yes      REVIEW OF SYSTEMS IS OTHERWISE NEGATIVE. Historical Information   Past Medical History:   Diagnosis Date    Diabetes mellitus ([address])     GERD (gastroesophageal reflux disease)     Hypertension      Past Surgical History:   Procedure Laterality Date    COLONOSCOPY      KNEE SURGERY      RECTAL SURGERY      fistula    UPPER GASTROINTESTINAL ENDOSCOPY       Social History   Social History     Substance and Sexual Activity   Alcohol Use Yes     Social History     Substance and Sexual Activity   Drug Use Never     Social History     Tobacco Use   Smoking Status Light Smoker    Types: Cigars   Smokeless Tobacco Never     Family History   Problem Relation Age of Onset    Colon cancer Father        Meds/Allergies       Current Outpatient Medications:     atorvastatin (LIPITOR) 10 mg tablet    cephalexin (KEFLEX) 250 mg capsule    Continuous Blood Gluc Sensor (FreeStyle Héctor 3 Sensor) MISC    insulin glargine (Toujeo SoloStar) 300 units/mL CONCENTRATED U-300 injection pen (1-unit dial)    Insulin Lispro w/ Trans Port 100 UNIT/ML SOPN    Ketostix strip    losartan (COZAAR) 100 MG tablet    omeprazole (PriLOSEC) 40 MG capsule    amLODIPine (NORVASC) 10 mg tablet    Aspirin Low Dose 81 MG chewable tablet    Dapagliflozin-metFORMIN HCl ER (Xigduo XR) 5-1000 MG TB24    levofloxacin (LEVAQUIN) 750 mg tablet    metoclopramide (REGLAN) 5 mg tablet    ondansetron (ZOFRAN) 4 mg tablet    pioglitazone (ACTOS) 15 mg tablet    scopolamine (TRANSDERM-SCOP) 1 mg/3 days TD 72 hr patch    sildenafil (VIAGRA) 50 MG tablet    No Known Allergies        Objective     Blood pressure 122/84, temperature (!) 97.1 °F (36.2 °C), temperature source Tympanic, height 5' 10" (1.778 m), weight 79.4 kg (175 lb).  Body mass index is 25.11 kg/m².      PHYSICAL EXAM:      General Appearance:   Alert, cooperative, no distress   HEENT:   Normocephalic, atraumatic, anicteric. Neck:  Supple, symmetrical, trachea midline   Lungs:   Clear to auscultation bilaterally; no rales, rhonchi or wheezing; respirations unlabored    Heart[de-identified]   Regular rate and rhythm; no murmur, rub, or gallop. Abdomen:   Soft, non-tender, non-distended; normal bowel sounds; no masses, no organomegaly    Genitalia:   Deferred    Rectal:   Deferred    Extremities:  No edema    Lymph nodes:  No palpable cervical lymphadenopathy    Skin:  No jaundice, rashes, or lesions          Lab Results:   No visits with results within 1 Day(s) from this visit. Latest known visit with results is:   Orders Only on 12/10/2022   Component Date Value    Hemoglobin A1C 12/10/2022 12.1 (H)          Radiology Results:   No results found.

## 2023-11-28 NOTE — LETTER
November 28, 2023     Melissa Aceves MD  [facility]    Patient: Donna Sandra   YOB: 1975   Date of Visit: 11/28/2023       Dear Dr. Ariella Ornelas:    Thank you for referring Donna Sandra to me for evaluation. Below are my notes for this consultation. If you have questions, please do not hesitate to call me. I look forward to following your patient along with you. Sincerely,        Loki Hurd MD        CC: No Recipients    oLki Hurd MD  11/28/2023  1:46 PM  [facility] - Outpatient Follow-up Note  Donna Sandra 53 y.o. male MRN: [id number]  Encounter: [id number]          ASSESSMENT AND PLAN:      1.  Gastroparesis  Patient presenting currently with symptoms of gastroparesis  His main symptoms are nausea, fullness/early satiety, and distention   Etiology of gastroparesis is probably from diabetes  He is currently on a modified diet and his weight has been stable  GCSI 29  Gastric emptying study has been performed and showed half emptying time was 323 minutes, gastric emptying at 4 hours was 37% which is conclusive of delayed gastric emptying  He was previously seen by [facility], underwent Botox injection and pyloric balloon dilation  States his symptoms improved for about 3 months after pyloric dilation and Botox injection  We had an extensive discussion about options for endoscopic treatment including Botox injection, per oral endoscopic myotomy, patient was also made aware that surgery and gastric pacemaker are possible option for treatment  We discussed that Botox effects are usually short lived and can make further intervention such as G-POEM more difficult, but positive response after Botox injection generally predicts success after G POEM  Patient was referred to [facility] to Dr Carolyn Mueller  to consider G POEM    I spent 30 minutes in chart review, face-to-face counseling, coordination of care, and documentation   ______________________________________________________________________    SUBJECTIVE: Patient seen and examined, he is a 55-year-old male patient with history of diabetes gastroparesis, he was recently seen by Dr. Itzel Gauthier and referred to me to consider CHUCK ONEAL, he persists with multiple symptoms of gastroparesis, otherwise he denies any recent events, currently is tolerating some p.o. and his weight has been stable, is passing flatus and having bowel movements, no recent weight loss    Gastroparesis Cardinal Symptom Index   Nausea/Vomiting  Nausea  very severe  Retching  very severe  Vomiting very severe    Fullness/Early satiety  Stomach fullness severe  Not able to finish meal severe  Fullness after eating severe  Loss of appetite severe    Bloating/distention  Bloating severe  Belly increase in size  severe    REVIEW OF SYSTEMS IS OTHERWISE NEGATIVE.       Historical Information  Past Medical History:   Diagnosis Date   • Diabetes mellitus ([address])    • GERD (gastroesophageal reflux disease)    • Hypertension      Past Surgical History:   Procedure Laterality Date   • COLONOSCOPY     • KNEE SURGERY     • RECTAL SURGERY      fistula   • UPPER GASTROINTESTINAL ENDOSCOPY       Social History  Social History     Substance and Sexual Activity   Alcohol Use Yes     Social History     Substance and Sexual Activity   Drug Use Never     Social History     Tobacco Use   Smoking Status Light Smoker   • Types: Cigars   Smokeless Tobacco Never     Family History   Problem Relation Age of Onset   • Colon cancer Father        Meds/Allergies      Current Outpatient Medications:   •  atorvastatin (LIPITOR) 10 mg tablet  •  cephalexin (KEFLEX) 250 mg capsule  •  Continuous Blood Gluc Sensor (FreeStyle Héctor 3 Sensor) MISC  •  insulin glargine (Toujeo SoloStar) 300 units/mL CONCENTRATED U-300 injection pen (1-unit dial)  •  Insulin Lispro w/ Trans Port 100 UNIT/ML SOPN  •  Ketostix strip  •  losartan (COZAAR) 100 MG tablet  •  omeprazole (PriLOSEC) 40 MG capsule  •  amLODIPine (NORVASC) 10 mg tablet  •  Aspirin Low Dose 81 MG chewable tablet  •  Dapagliflozin-metFORMIN HCl ER (Xigduo XR) 5-1000 MG TB24  •  levofloxacin (LEVAQUIN) 750 mg tablet  •  metoclopramide (REGLAN) 5 mg tablet  •  ondansetron (ZOFRAN) 4 mg tablet  •  pioglitazone (ACTOS) 15 mg tablet  •  scopolamine (TRANSDERM-SCOP) 1 mg/3 days TD 72 hr patch  •  sildenafil (VIAGRA) 50 MG tablet    No Known Allergies        Objective    Blood pressure 122/84, temperature (!) 97.1 °F (36.2 °C), temperature source Tympanic, height 5' 10" (1.778 m), weight 79.4 kg (175 lb). Body mass index is 25.11 kg/m². PHYSICAL EXAM:      General Appearance:   Alert, cooperative, no distress   HEENT:   Normocephalic, atraumatic, anicteric. Neck:  Supple, symmetrical, trachea midline   Lungs:   Clear to auscultation bilaterally; no rales, rhonchi or wheezing; respirations unlabored    Heart[de-identified]   Regular rate and rhythm; no murmur, rub, or gallop. Abdomen:   Soft, non-tender, non-distended; normal bowel sounds; no masses, no organomegaly    Genitalia:   Deferred    Rectal:   Deferred    Extremities:  No edema    Lymph nodes:  No palpable cervical lymphadenopathy    Skin:  No jaundice, rashes, or lesions          Lab Results:   No visits with results within 1 Day(s) from this visit. Latest known visit with results is:   Orders Only on 12/10/2022   Component Date Value   • Hemoglobin A1C 12/10/2022 12.1 (H)          Radiology Results:   No results found. Stefan Holloway MD  11/28/2023 10:55 AM  Sign when Signing Visit  [facility] - Outpatient Follow-up Note  Ivonne Lehman 53 y.o. male MRN: [id number]  Encounter: [id number]          ASSESSMENT AND PLAN:      1.  Gastroparesis  Patient presenting currently with symptoms of gastroparesis  Her main symptoms are nausea/vomiting, fullness/early satiety, bloating/distention  Etiology of gastroparesis is probably   She is currently on a modified diet and her weight has been ***  GCSI ***  Gastric emptying study has been performed and showed half emptying time was ***, gastric emptying at 4 hours was ***  We had an extensive discussion about options for endoscopic treatment including Botox injection, per oral endoscopic myotomy, patient was also made aware that surgery and gastric pacemaker are possible option for treatment  We discussed that Botox effects are usually short lived and can make further intervention such as G-POEM more difficult  Patient decided for G-POEM, patient was made aware this is a novel endoscopic procedure that provide some relief of her gastroparesis symptoms similar to surgical pyloroplasty, patient was made aware G-POEM does not provide a cure for her gastroparesis and symptoms do not improve at all in some patients  Patient is aware that hospital admission is required after the procedure, usually for 1 night. The patient is in agreement with the plan, the risks and benefits of the procedure were discussed with the patient including but not limited to bleeding, infection, perforation, CO2 related complications such as capnothorax, capnomediastinum, capnoperitoneum, subcutaneous emphysema, as well as the need for surgery in case of emergency.      ______________________________________________________________________    SUBJECTIVE: Patient seen and examined, denies any recent events, currently is tolerating PO route, denies nausea or vomiting, is passing flatus and having bowel movements, denies abdominal pain, no recent weight loss    Gastroparesis Cardinal Symptom Index   Nausea/Vomiting  Nausea  very severe  Retching  very severe  Vomiting very severe    Fullness/Early satiety  Stomach fullness severe  Not able to finish meal severe  Fullness after eating severe  Loss of appetite severe    Bloating/distention  Bloating severe  Belly increase in size  severe    REVIEW OF SYSTEMS IS OTHERWISE NEGATIVE. Historical Information  Past Medical History:   Diagnosis Date   • Diabetes mellitus ([address])    • GERD (gastroesophageal reflux disease)    • Hypertension      Past Surgical History:   Procedure Laterality Date   • COLONOSCOPY     • KNEE SURGERY     • RECTAL SURGERY      fistula   • UPPER GASTROINTESTINAL ENDOSCOPY       Social History  Social History     Substance and Sexual Activity   Alcohol Use Yes     Social History     Substance and Sexual Activity   Drug Use Never     Social History     Tobacco Use   Smoking Status Light Smoker   • Types: Cigars   Smokeless Tobacco Never     Family History   Problem Relation Age of Onset   • Colon cancer Father        Meds/Allergies      Current Outpatient Medications:   •  atorvastatin (LIPITOR) 10 mg tablet  •  cephalexin (KEFLEX) 250 mg capsule  •  Continuous Blood Gluc Sensor (FreeStyle Héctor 3 Sensor) MISC  •  insulin glargine (Toujeo SoloStar) 300 units/mL CONCENTRATED U-300 injection pen (1-unit dial)  •  Insulin Lispro w/ Trans Port 100 UNIT/ML SOPN  •  Ketostix strip  •  losartan (COZAAR) 100 MG tablet  •  omeprazole (PriLOSEC) 40 MG capsule  •  amLODIPine (NORVASC) 10 mg tablet  •  Aspirin Low Dose 81 MG chewable tablet  •  Dapagliflozin-metFORMIN HCl ER (Xigduo XR) 5-1000 MG TB24  •  levofloxacin (LEVAQUIN) 750 mg tablet  •  metoclopramide (REGLAN) 5 mg tablet  •  ondansetron (ZOFRAN) 4 mg tablet  •  pioglitazone (ACTOS) 15 mg tablet  •  scopolamine (TRANSDERM-SCOP) 1 mg/3 days TD 72 hr patch  •  sildenafil (VIAGRA) 50 MG tablet    No Known Allergies        Objective    Blood pressure 122/84, temperature (!) 97.1 °F (36.2 °C), temperature source Tympanic, height 5' 10" (1.778 m), weight 79.4 kg (175 lb). Body mass index is 25.11 kg/m². PHYSICAL EXAM:      General Appearance:   Alert, cooperative, no distress   HEENT:   Normocephalic, atraumatic, anicteric.      Neck:  Supple, symmetrical, trachea midline   Lungs:   Clear to auscultation bilaterally; no rales, rhonchi or wheezing; respirations unlabored    Heart[de-identified]   Regular rate and rhythm; no murmur, rub, or gallop. Abdomen:   Soft, non-tender, non-distended; normal bowel sounds; no masses, no organomegaly    Genitalia:   Deferred    Rectal:   Deferred    Extremities:  No edema    Lymph nodes:  No palpable cervical lymphadenopathy    Skin:  No jaundice, rashes, or lesions          Lab Results:   No visits with results within 1 Day(s) from this visit. Latest known visit with results is:   Orders Only on 12/10/2022   Component Date Value   • Hemoglobin A1C 12/10/2022 12.1 (H)          Radiology Results:   No results found.

## 2023-11-28 NOTE — LETTER
November 28, 2023     Moe Guillermo MD  [address] [facility] [address] [place] [address] [address]    Patient: Hermelindo Martin   YOB: 1975   Date of Visit: 11/28/2023       Dear Dr. Rachel Capps:    Thank you for referring Hermelindo Martin to me for evaluation. Below are my notes for this consultation. If you have questions, please do not hesitate to call me. I look forward to following your patient along with you. Sincerely,        Shannon Rasmussen MD        CC: No Recipients    Shannon Rasmussen MD  11/28/2023  1:46 PM  Incomplete  [facility] - Outpatient Follow-up Note  Hermelindo Martin 53 y.o. male MRN: [id number]  Encounter: [id number]          ASSESSMENT AND PLAN:      1.  Gastroparesis  Patient presenting currently with symptoms of gastroparesis  His main symptoms are nausea, fullness/early satiety, and distention   Etiology of gastroparesis is probably from diabetes  He is currently on a modified diet and his weight has been stable  GCSI 29  Gastric emptying study has been performed and showed half emptying time was 323 minutes, gastric emptying at 4 hours was 37% which is conclusive of delayed gastric emptying  He was previously seen by [facility] motility, underwent Botox injection and pyloric balloon dilation  States his symptoms improved for about 3 months after pyloric dilation and Botox injection  We had an extensive discussion about options for endoscopic treatment including Botox injection, per oral endoscopic myotomy, patient was also made aware that surgery and gastric pacemaker are possible option for treatment  We discussed that Botox effects are usually short lived and can make further intervention such as G-POEM more difficult, but positive response after Botox injection generally predicts success after G POEM  Patient was referred to [facility] Yeung to Dr Jimena Garcia  to consider G POEM    I spent 30 minutes in chart review, face-to-face counseling, coordination of care, and documentation   ______________________________________________________________________    SUBJECTIVE: Patient seen and examined, he is a 71-year-old male patient with history of diabetes gastroparesis, he was recently seen by Dr. Deb Payne and referred to me to consider CHUCK ONEAL, he persists with multiple symptoms of gastroparesis, otherwise he denies any recent events, currently is tolerating some p.o. and his weight has been stable, is passing flatus and having bowel movements, no recent weight loss    Gastroparesis Cardinal Symptom Index   Nausea/Vomiting  Nausea  very severe  Retching  very severe  Vomiting very severe    Fullness/Early satiety  Stomach fullness severe  Not able to finish meal severe  Fullness after eating severe  Loss of appetite severe    Bloating/distention  Bloating severe  Belly increase in size  severe    REVIEW OF SYSTEMS IS OTHERWISE NEGATIVE.       Historical Information  Past Medical History:   Diagnosis Date   • Diabetes mellitus ([address])    • GERD (gastroesophageal reflux disease)    • Hypertension      Past Surgical History:   Procedure Laterality Date   • COLONOSCOPY     • KNEE SURGERY     • RECTAL SURGERY      fistula   • UPPER GASTROINTESTINAL ENDOSCOPY       Social History  Social History     Substance and Sexual Activity   Alcohol Use Yes     Social History     Substance and Sexual Activity   Drug Use Never     Social History     Tobacco Use   Smoking Status Light Smoker   • Types: Cigars   Smokeless Tobacco Never     Family History   Problem Relation Age of Onset   • Colon cancer Father        Meds/Allergies      Current Outpatient Medications:   •  atorvastatin (LIPITOR) 10 mg tablet  •  cephalexin (KEFLEX) 250 mg capsule  •  Continuous Blood Gluc Sensor (FreeStyle Héctor 3 Sensor) MISC  •  insulin glargine (Toujeo SoloStar) 300 units/mL CONCENTRATED U-300 injection pen (1-unit dial)  •  Insulin Lispro w/ Trans Port 100 UNIT/ML SOPN  •  Ketostix strip  • losartan (COZAAR) 100 MG tablet  •  omeprazole (PriLOSEC) 40 MG capsule  •  amLODIPine (NORVASC) 10 mg tablet  •  Aspirin Low Dose 81 MG chewable tablet  •  Dapagliflozin-metFORMIN HCl ER (Xigduo XR) 5-1000 MG TB24  •  levofloxacin (LEVAQUIN) 750 mg tablet  •  metoclopramide (REGLAN) 5 mg tablet  •  ondansetron (ZOFRAN) 4 mg tablet  •  pioglitazone (ACTOS) 15 mg tablet  •  scopolamine (TRANSDERM-SCOP) 1 mg/3 days TD 72 hr patch  •  sildenafil (VIAGRA) 50 MG tablet    No Known Allergies        Objective    Blood pressure 122/84, temperature (!) 97.1 °F (36.2 °C), temperature source Tympanic, height 5' 10" (1.778 m), weight 79.4 kg (175 lb). Body mass index is 25.11 kg/m². PHYSICAL EXAM:      General Appearance:   Alert, cooperative, no distress   HEENT:   Normocephalic, atraumatic, anicteric. Neck:  Supple, symmetrical, trachea midline   Lungs:   Clear to auscultation bilaterally; no rales, rhonchi or wheezing; respirations unlabored    Heart[de-identified]   Regular rate and rhythm; no murmur, rub, or gallop. Abdomen:   Soft, non-tender, non-distended; normal bowel sounds; no masses, no organomegaly    Genitalia:   Deferred    Rectal:   Deferred    Extremities:  No edema    Lymph nodes:  No palpable cervical lymphadenopathy    Skin:  No jaundice, rashes, or lesions          Lab Results:   No visits with results within 1 Day(s) from this visit. Latest known visit with results is:   Orders Only on 12/10/2022   Component Date Value   • Hemoglobin A1C 12/10/2022 12.1 (H)          Radiology Results:   No results found. Wilmer Wang MD  11/28/2023 10:55 AM  Sign when Signing Visit  [facility] - Outpatient Follow-up Note  Zoey Sales 53 y.o. male MRN: [id number]  Encounter: [id number]          ASSESSMENT AND PLAN:      1.  Gastroparesis  Patient presenting currently with symptoms of gastroparesis  Her main symptoms are nausea/vomiting, fullness/early satiety, bloating/distention  Etiology of gastroparesis is probably   She is currently on a modified diet and her weight has been ***  GCSI ***  Gastric emptying study has been performed and showed half emptying time was ***, gastric emptying at 4 hours was ***  We had an extensive discussion about options for endoscopic treatment including Botox injection, per oral endoscopic myotomy, patient was also made aware that surgery and gastric pacemaker are possible option for treatment  We discussed that Botox effects are usually short lived and can make further intervention such as G-POEM more difficult  Patient decided for G-POEM, patient was made aware this is a novel endoscopic procedure that provide some relief of her gastroparesis symptoms similar to surgical pyloroplasty, patient was made aware G-POEM does not provide a cure for her gastroparesis and symptoms do not improve at all in some patients  Patient is aware that hospital admission is required after the procedure, usually for 1 night. The patient is in agreement with the plan, the risks and benefits of the procedure were discussed with the patient including but not limited to bleeding, infection, perforation, CO2 related complications such as capnothorax, capnomediastinum, capnoperitoneum, subcutaneous emphysema, as well as the need for surgery in case of emergency.      ______________________________________________________________________    SUBJECTIVE: Patient seen and examined, denies any recent events, currently is tolerating PO route, denies nausea or vomiting, is passing flatus and having bowel movements, denies abdominal pain, no recent weight loss    Gastroparesis Cardinal Symptom Index   Nausea/Vomiting  Nausea  very severe  Retching  very severe  Vomiting very severe    Fullness/Early satiety  Stomach fullness severe  Not able to finish meal severe  Fullness after eating severe  Loss of appetite severe    Bloating/distention  Bloating severe  Belly increase in size  severe    REVIEW OF SYSTEMS IS OTHERWISE NEGATIVE. Historical Information  Past Medical History:   Diagnosis Date   • Diabetes mellitus ([address])    • GERD (gastroesophageal reflux disease)    • Hypertension      Past Surgical History:   Procedure Laterality Date   • COLONOSCOPY     • KNEE SURGERY     • RECTAL SURGERY      fistula   • UPPER GASTROINTESTINAL ENDOSCOPY       Social History  Social History     Substance and Sexual Activity   Alcohol Use Yes     Social History     Substance and Sexual Activity   Drug Use Never     Social History     Tobacco Use   Smoking Status Light Smoker   • Types: Cigars   Smokeless Tobacco Never     Family History   Problem Relation Age of Onset   • Colon cancer Father        Meds/Allergies      Current Outpatient Medications:   •  atorvastatin (LIPITOR) 10 mg tablet  •  cephalexin (KEFLEX) 250 mg capsule  •  Continuous Blood Gluc Sensor (FreeStyle Héctor 3 Sensor) MISC  •  insulin glargine (Toujeo SoloStar) 300 units/mL CONCENTRATED U-300 injection pen (1-unit dial)  •  Insulin Lispro w/ Trans Port 100 UNIT/ML SOPN  •  Ketostix strip  •  losartan (COZAAR) 100 MG tablet  •  omeprazole (PriLOSEC) 40 MG capsule  •  amLODIPine (NORVASC) 10 mg tablet  •  Aspirin Low Dose 81 MG chewable tablet  •  Dapagliflozin-metFORMIN HCl ER (Xigduo XR) 5-1000 MG TB24  •  levofloxacin (LEVAQUIN) 750 mg tablet  •  metoclopramide (REGLAN) 5 mg tablet  •  ondansetron (ZOFRAN) 4 mg tablet  •  pioglitazone (ACTOS) 15 mg tablet  •  scopolamine (TRANSDERM-SCOP) 1 mg/3 days TD 72 hr patch  •  sildenafil (VIAGRA) 50 MG tablet    No Known Allergies        Objective    Blood pressure 122/84, temperature (!) 97.1 °F (36.2 °C), temperature source Tympanic, height 5' 10" (1.778 m), weight 79.4 kg (175 lb). Body mass index is 25.11 kg/m². PHYSICAL EXAM:      General Appearance:   Alert, cooperative, no distress   HEENT:   Normocephalic, atraumatic, anicteric.      Neck:  Supple, symmetrical, trachea midline   Lungs:   Clear to auscultation bilaterally; no rales, rhonchi or wheezing; respirations unlabored    Heart[de-identified]   Regular rate and rhythm; no murmur, rub, or gallop. Abdomen:   Soft, non-tender, non-distended; normal bowel sounds; no masses, no organomegaly    Genitalia:   Deferred    Rectal:   Deferred    Extremities:  No edema    Lymph nodes:  No palpable cervical lymphadenopathy    Skin:  No jaundice, rashes, or lesions          Lab Results:   No visits with results within 1 Day(s) from this visit. Latest known visit with results is:   Orders Only on 12/10/2022   Component Date Value   • Hemoglobin A1C 12/10/2022 12.1 (H)          Radiology Results:   No results found.

## 2024-04-23 ENCOUNTER — TRANSCRIBE ORDERS (OUTPATIENT)
Dept: GASTROENTEROLOGY | Facility: CLINIC | Age: 49
End: 2024-04-23

## 2024-05-06 ENCOUNTER — TRANSCRIBE ORDERS (OUTPATIENT)
Dept: GASTROENTEROLOGY | Facility: CLINIC | Age: 49
End: 2024-05-06

## 2024-05-20 ENCOUNTER — TRANSCRIBE ORDERS (OUTPATIENT)
Dept: GASTROENTEROLOGY | Facility: CLINIC | Age: 49
End: 2024-05-20

## 2025-01-27 DIAGNOSIS — M25.561 RIGHT KNEE PAIN, UNSPECIFIED CHRONICITY: Primary | ICD-10-CM

## 2025-01-28 ENCOUNTER — HOSPITAL ENCOUNTER (OUTPATIENT)
Dept: ORTHOPEDIC SURGERY | Age: 50
Discharge: HOME | End: 2025-01-30
Payer: COMMERCIAL

## 2025-01-28 ENCOUNTER — TELEPHONE (OUTPATIENT)
Dept: ORTHOPEDIC SURGERY | Age: 50
End: 2025-01-28

## 2025-01-28 ENCOUNTER — OFFICE VISIT (OUTPATIENT)
Dept: ORTHOPEDIC SURGERY | Age: 50
End: 2025-01-28
Payer: COMMERCIAL

## 2025-01-28 VITALS — HEIGHT: 71 IN | BODY MASS INDEX: 26.6 KG/M2 | WEIGHT: 190 LBS

## 2025-01-28 DIAGNOSIS — M25.461 EFFUSION OF RIGHT KNEE JOINT: Primary | ICD-10-CM

## 2025-01-28 DIAGNOSIS — M25.561 RIGHT KNEE PAIN, UNSPECIFIED CHRONICITY: ICD-10-CM

## 2025-01-28 DIAGNOSIS — M25.561 PAIN IN JOINT OF RIGHT KNEE: ICD-10-CM

## 2025-01-28 DIAGNOSIS — S83.411A SPRAIN OF MEDIAL COLLATERAL LIGAMENT OF RIGHT KNEE, INITIAL ENCOUNTER: ICD-10-CM

## 2025-01-28 PROCEDURE — 73564 X-RAY EXAM KNEE 4 OR MORE: CPT

## 2025-01-28 PROCEDURE — 99204 OFFICE O/P NEW MOD 45 MIN: CPT | Performed by: FAMILY MEDICINE

## 2025-01-28 RX ORDER — MELOXICAM 15 MG/1
15 TABLET ORAL DAILY
Qty: 30 TABLET | Refills: 1 | Status: SHIPPED | OUTPATIENT
Start: 2025-01-28

## 2025-01-28 ASSESSMENT — ENCOUNTER SYMPTOMS: BACK PAIN: 0

## 2025-01-28 NOTE — PROGRESS NOTES
Alcohol use: Yes     Comment: 6 pack a day       PMH, Surgical Hx, Family Hx, and Social Hx reviewed and updated.  Health Maintenance reviewed.    Reviewed with the patient: current clinical status, medications, labs, images, activities and diet.     Side effects, adverse effects of the medication prescribed today, as well as treatment plan/ rationale and result expectations have been discussed with the patient who expresses understanding and desires to proceed.  Close follow up to evaluate treatment results and for coordination of care.    I have reviewed the patient's medical history in detail and updated the computerized patient record.    Objective     Vitals:    01/28/25 0911   Weight: 86.2 kg (190 lb)   Height: 1.803 m (5' 11\")        Physical Exam  Musculoskeletal:      Comments: -Tenderness to palpation along the medial joint line, positive Thessaly test, positive suprapatellar effusion with palpation.  Cruciate ligament testing unremarkable.  Minimal tenderness with stress applied to the MCL though no significant laxity noted.  LCL testing unremarkable.  Pain worsens with prolonged ambulation, climbing, crouching down to get on the ground.             Please note, this report has been partially produced using speech recognition software and may cause  and /or contain errors related to that system including grammar, punctuation and spelling as well as words and phrases that may seem inappropriate. If there are questions or concerns please feel free to contact me to clarify.    Gibran Kim MD

## 2025-02-06 ENCOUNTER — OFFICE VISIT (OUTPATIENT)
Dept: ORTHOPEDIC SURGERY | Age: 50
End: 2025-02-06

## 2025-02-06 VITALS — HEIGHT: 71 IN | BODY MASS INDEX: 26.6 KG/M2 | WEIGHT: 190 LBS

## 2025-02-06 DIAGNOSIS — M25.461 EFFUSION OF RIGHT KNEE JOINT: ICD-10-CM

## 2025-02-06 DIAGNOSIS — S83.8X1D INJURY OF MENISCUS OF RIGHT KNEE, SUBSEQUENT ENCOUNTER: Primary | ICD-10-CM

## 2025-02-06 DIAGNOSIS — S83.411D SPRAIN OF MEDIAL COLLATERAL LIGAMENT OF RIGHT KNEE, SUBSEQUENT ENCOUNTER: ICD-10-CM

## 2025-02-06 ASSESSMENT — ENCOUNTER SYMPTOMS: BACK PAIN: 0

## 2025-02-06 NOTE — PROGRESS NOTES
[facility] [place] [facility], [facility]  [address] [id number]  Dept: [phone]  Dept Fax: [phone]  Loc: [phone]     2/6/2025    Visit type: Follow up    Reason for Visit: Follow-up (Pt c/o Rt knee aspiration. )         Patient: Austen Nuno is a 49 y.o. male     HPI: 49-year-old male presents for follow-up to have aspiration and possible steroid injection of the right knee if warranted.  Patient is being followed for right knee injury that occurred while at work, this is a workers comp case.  The original date of injury was 11/1/2024.  Patient has already had meloxicam prescribed, physical therapy ordered, x-ray ordered and reviewed prior to visit.  No fracture or dislocation seen on x-ray.    ASSESSMENT/PLAN   Injury of meniscus of right knee, subsequent encounter  -     DRAIN/INJECT LARGE JOINT/BURSA  Effusion of right knee joint  Sprain of medial collateral ligament of right knee, subsequent encounter     -Patient here today for procedure.  No significant effusion at this time, there is trace amount in the suprapatellar space.  Discussed consideration for aspiration versus intra-articular injection alone.  Patient is not having any significant pain or tightness in the suprapatellar region and only along the joint line.  -Patient will continue with meloxicam and physical therapy as previously prescribed.  Work restrictions were provided at regular visit.  -Intra-articular Kenalog injection of the right knee, no complications.    -Discussed consideration for injection today, due to decrease in activity and increased pain patient would gain significant improvement from an injection versus conservative measures alone.  Patient agreed to the injection, verbal consent was given.   -Prior to procedure the patient was verified, location was verified, laterality was verified, and the procedure itself was verified  -Before

## 2025-02-17 ENCOUNTER — OFFICE VISIT (OUTPATIENT)
Dept: ORTHOPEDIC SURGERY | Age: 50
End: 2025-02-17
Payer: COMMERCIAL

## 2025-02-17 ENCOUNTER — HOSPITAL ENCOUNTER (OUTPATIENT)
Dept: GENERAL RADIOLOGY | Age: 50
Discharge: HOME | End: 2025-02-19
Attending: FAMILY MEDICINE
Payer: COMMERCIAL

## 2025-02-17 VITALS — OXYGEN SATURATION: 97 % | BODY MASS INDEX: 26.6 KG/M2 | HEART RATE: 116 BPM | WEIGHT: 190 LBS | HEIGHT: 71 IN

## 2025-02-17 DIAGNOSIS — M54.50 LUMBAR BACK PAIN: ICD-10-CM

## 2025-02-17 DIAGNOSIS — R07.81 RIB PAIN ON LEFT SIDE: Primary | ICD-10-CM

## 2025-02-17 DIAGNOSIS — R52 PAIN: ICD-10-CM

## 2025-02-17 DIAGNOSIS — R07.81 RIB PAIN ON LEFT SIDE: ICD-10-CM

## 2025-02-17 DIAGNOSIS — S29.8XXA BLUNT TRAUMA OF RIB, INITIAL ENCOUNTER: ICD-10-CM

## 2025-02-17 DIAGNOSIS — M54.6 ACUTE LEFT-SIDED THORACIC BACK PAIN: ICD-10-CM

## 2025-02-17 PROCEDURE — G8427 DOCREV CUR MEDS BY ELIG CLIN: HCPCS | Performed by: FAMILY MEDICINE

## 2025-02-17 PROCEDURE — 99214 OFFICE O/P EST MOD 30 MIN: CPT | Performed by: FAMILY MEDICINE

## 2025-02-17 PROCEDURE — 72070 X-RAY EXAM THORAC SPINE 2VWS: CPT

## 2025-02-17 PROCEDURE — 71045 X-RAY EXAM CHEST 1 VIEW: CPT

## 2025-02-17 PROCEDURE — 71100 X-RAY EXAM RIBS UNI 2 VIEWS: CPT

## 2025-02-17 PROCEDURE — 72100 X-RAY EXAM L-S SPINE 2/3 VWS: CPT

## 2025-02-17 PROCEDURE — G8419 CALC BMI OUT NRM PARAM NOF/U: HCPCS | Performed by: FAMILY MEDICINE

## 2025-02-17 PROCEDURE — 1036F TOBACCO NON-USER: CPT | Performed by: FAMILY MEDICINE

## 2025-02-17 RX ORDER — TRAMADOL HYDROCHLORIDE 50 MG/1
50 TABLET ORAL EVERY 6 HOURS PRN
Qty: 20 TABLET | Refills: 0 | Status: SHIPPED | OUTPATIENT
Start: 2025-02-17 | End: 2025-02-22

## 2025-02-17 SDOH — HEALTH STABILITY: PHYSICAL HEALTH: ON AVERAGE, HOW MANY DAYS PER WEEK DO YOU ENGAGE IN MODERATE TO STRENUOUS EXERCISE (LIKE A BRISK WALK)?: 0 DAYS

## 2025-02-17 SDOH — HEALTH STABILITY: PHYSICAL HEALTH: ON AVERAGE, HOW MANY MINUTES DO YOU ENGAGE IN EXERCISE AT THIS LEVEL?: 0 MIN

## 2025-02-17 ASSESSMENT — ENCOUNTER SYMPTOMS: BACK PAIN: 1

## 2025-02-17 NOTE — PROGRESS NOTES
pantoprazole (PROTONIX) 40 MG tablet, Take 1 tablet by mouth 2 times daily (before meals), Disp: 28 tablet, Rfl: 0   Past Medical History:   Diagnosis Date    Asthma      No past surgical history on file.  No family history on file.  Social History     Tobacco Use    Smoking status: Never    Smokeless tobacco: Never   Substance Use Topics    Alcohol use: Yes     Comment: 6 pack a day       PMH, Surgical Hx, Family Hx, and Social Hx reviewed and updated.  Health Maintenance reviewed.    Reviewed with the patient: current clinical status, medications, labs, images, activities and diet.     Side effects, adverse effects of the medication prescribed today, as well as treatment plan/ rationale and result expectations have been discussed with the patient who expresses understanding and desires to proceed.  Close follow up to evaluate treatment results and for coordination of care.    I have reviewed the patient's medical history in detail and updated the computerized patient record.    Objective     Vitals:    02/17/25 0952   Pulse: (!) 116   SpO2: 97%   Weight: 86.2 kg (190 lb)   Height: 1.803 m (5' 11\")        Physical Exam  Musculoskeletal:      Comments: -Minimal tenderness to palpation of the thoracic spine and lumbar spine at midline, more significant tenderness to palpation of the left lumbar and thoracic region as well as left-sided rib cage.  Slump test negative, strength is 5/5 bilateral lower extremities without sensation deficits.  Reflexes 2+ bilateral lower extremities.  Worsening pain with taking a deep breath           Please note, this report has been partially produced using speech recognition software and may cause  and /or contain errors related to that system including grammar, punctuation and spelling as well as words and phrases that may seem inappropriate. If there are questions or concerns please feel free to contact me to clarify.    Gibran Kim MD

## 2025-03-03 ENCOUNTER — OFFICE VISIT (OUTPATIENT)
Dept: ORTHOPEDIC SURGERY | Age: 50
End: 2025-03-03
Payer: COMMERCIAL

## 2025-03-03 ENCOUNTER — HOSPITAL ENCOUNTER (OUTPATIENT)
Dept: ORTHOPEDIC SURGERY | Age: 50
Discharge: HOME | End: 2025-03-05
Payer: COMMERCIAL

## 2025-03-03 ENCOUNTER — HOSPITAL ENCOUNTER (OUTPATIENT)
Dept: GENERAL RADIOLOGY | Age: 50
Discharge: HOME | End: 2025-03-05
Attending: FAMILY MEDICINE
Payer: COMMERCIAL

## 2025-03-03 VITALS — BODY MASS INDEX: 26.6 KG/M2 | WEIGHT: 190 LBS | HEART RATE: 108 BPM | HEIGHT: 71 IN | OXYGEN SATURATION: 97 %

## 2025-03-03 DIAGNOSIS — S22.32XD CLOSED FRACTURE OF ONE RIB OF LEFT SIDE WITH ROUTINE HEALING, SUBSEQUENT ENCOUNTER: Primary | ICD-10-CM

## 2025-03-03 DIAGNOSIS — M54.50 LUMBAR BACK PAIN: ICD-10-CM

## 2025-03-03 DIAGNOSIS — M54.6 ACUTE LEFT-SIDED THORACIC BACK PAIN: ICD-10-CM

## 2025-03-03 DIAGNOSIS — S22.32XD CLOSED FRACTURE OF ONE RIB OF LEFT SIDE WITH ROUTINE HEALING, SUBSEQUENT ENCOUNTER: ICD-10-CM

## 2025-03-03 DIAGNOSIS — R07.81 RIB PAIN ON LEFT SIDE: ICD-10-CM

## 2025-03-03 PROCEDURE — G8427 DOCREV CUR MEDS BY ELIG CLIN: HCPCS | Performed by: FAMILY MEDICINE

## 2025-03-03 PROCEDURE — 1036F TOBACCO NON-USER: CPT | Performed by: FAMILY MEDICINE

## 2025-03-03 PROCEDURE — 71100 X-RAY EXAM RIBS UNI 2 VIEWS: CPT

## 2025-03-03 PROCEDURE — 99213 OFFICE O/P EST LOW 20 MIN: CPT | Performed by: FAMILY MEDICINE

## 2025-03-03 PROCEDURE — G8419 CALC BMI OUT NRM PARAM NOF/U: HCPCS | Performed by: FAMILY MEDICINE

## 2025-03-03 RX ORDER — DICLOFENAC SODIUM 75 MG/1
75 TABLET, DELAYED RELEASE ORAL 2 TIMES DAILY PRN
Qty: 60 TABLET | Refills: 1 | Status: SHIPPED | OUTPATIENT
Start: 2025-03-03

## 2025-03-03 ASSESSMENT — ENCOUNTER SYMPTOMS: BACK PAIN: 0

## 2025-03-03 NOTE — PROGRESS NOTES
[facility] [place] [facility], [facility]  [address] [place] [address]  [address] [place], [place] [address]  Dept: [phone]  Dept Fax: [phone]  Loc: [phone]     3/3/2025    Visit type: Follow up    Reason for Visit: Follow-up (Pt states there is no improvement since LOV, pain continues in Lt rib.)         Patient: Austen Nuno is a 50 y.o. male     HPI: 50-year-old male presents for follow-up visit pertaining to left rib fracture, lower back pain and thoracic back pain.  This is a workers comp case.  Patient states he continues to have significant pain of the left rib region.    ASSESSMENT/PLAN   Closed fracture of one rib of left side with routine healing, subsequent encounter  -     XR RIBS LEFT (2 VIEWS); Future  -     diclofenac (VOLTAREN) 75 MG EC tablet; Take 1 tablet by mouth 2 times daily as needed (Inflammation) Take with food, Disp-60 tablet, R-1Normal  Acute left-sided thoracic back pain  Lumbar back pain     -Treatment options discussed and all questions answered.  -X-ray not done prior to visit, patient will get it after the visit.  Will review once completed  -Diclofenac 75 mg twice daily prescribed today, patient did not get much relief with tramadol  -Work restrictions given  -It has been almost 3 weeks since the date of fracture.  Expect 6 to 8 weeks total for fracture healing, will see patient back in a month with new x-ray prior to visit and should be able to discharge at that time    -No tenderness to palpation of the lumbar spine or thoracic spine today.  Strength is 5/5 bilateral lower extremities, no sensation deficits.  Reflexes 2+ bilateral lower extremities.  -Tenderness to palpation of the ninth rib on the left, pain worsens with rotation or bending.      Orders Placed This Encounter   Medications    diclofenac (VOLTAREN) 75 MG EC tablet     Sig: Take 1 tablet by mouth 2 times daily as needed (Inflammation) Take with food     Dispense:  60 tablet

## 2025-03-20 ENCOUNTER — OFFICE VISIT (OUTPATIENT)
Dept: ORTHOPEDIC SURGERY | Age: 50
End: 2025-03-20
Payer: COMMERCIAL

## 2025-03-20 VITALS — OXYGEN SATURATION: 97 % | BODY MASS INDEX: 26.6 KG/M2 | WEIGHT: 190 LBS | HEIGHT: 71 IN

## 2025-03-20 DIAGNOSIS — S83.8X1D INJURY OF MENISCUS OF RIGHT KNEE, SUBSEQUENT ENCOUNTER: ICD-10-CM

## 2025-03-20 DIAGNOSIS — S22.32XD CLOSED FRACTURE OF ONE RIB OF LEFT SIDE WITH ROUTINE HEALING, SUBSEQUENT ENCOUNTER: Primary | ICD-10-CM

## 2025-03-20 PROCEDURE — G8427 DOCREV CUR MEDS BY ELIG CLIN: HCPCS | Performed by: FAMILY MEDICINE

## 2025-03-20 PROCEDURE — 1036F TOBACCO NON-USER: CPT | Performed by: FAMILY MEDICINE

## 2025-03-20 PROCEDURE — G8419 CALC BMI OUT NRM PARAM NOF/U: HCPCS | Performed by: FAMILY MEDICINE

## 2025-03-20 PROCEDURE — 99213 OFFICE O/P EST LOW 20 MIN: CPT | Performed by: FAMILY MEDICINE

## 2025-03-20 ASSESSMENT — ENCOUNTER SYMPTOMS: BACK PAIN: 0

## 2025-03-20 NOTE — PROGRESS NOTES
[facility]ANS [place] [facility], [facility]  [address] [place], [place] [address]  Dept: [phone]  Dept Fax: [phone]  Loc: [phone]     3/20/2025    Visit type: Follow up    Reason for Visit: Follow-up (Pt states there is improvement since last office visit 02/06/2025.  Denies any pain/discomfort, swelling. )         Patient: Austen Nuno is a 50 y.o. male     HPI: 50-year-old male presents for follow-up visit pertaining to left rib fracture and right knee injury after a work-related injury.  This is a workers comp case.  Patient has had improvement with the knee though continues to have pain of the left rib.    ASSESSMENT/PLAN   Closed fracture of one rib of left side with routine healing, subsequent encounter  Injury of meniscus of right knee, subsequent encounter     -Pertinent physical exam: No tenderness to palpation of the medial joint line or lateral joint line of the right knee.  Cruciate and collateral, stressing unremarkable.  Pain associated with deep breathing, laughing, coughing with the left ribs.    -Treatment options were discussed and all questions were answered  -Discussed use of ice and heat as needed, discussed activity modification  -Oral/topical medications: Diclofenac 75 mg as needed previously prescribed  -Imaging: Previous x-ray showing left rib fracture  -Discussion/Follow-up: 3/31/2025, aiming for discharge pending improvement overall and decrease in pain      No orders of the defined types were placed in this encounter.       Subjective      Review of Systems   Musculoskeletal:  Negative for arthralgias, back pain, gait problem, joint swelling, myalgias and neck pain.        Left rib pain, no pain of the right knee   Skin:  Negative for wound.   Neurological:  Negative for dizziness, weakness and numbness.      No Known Allergies    Current Outpatient Medications:     diclofenac (VOLTAREN) 75 MG EC tablet, Take 1 tablet by

## 2025-04-03 ENCOUNTER — OFFICE VISIT (OUTPATIENT)
Age: 50
End: 2025-04-03
Payer: COMMERCIAL

## 2025-04-03 VITALS
OXYGEN SATURATION: 99 % | WEIGHT: 180 LBS | HEART RATE: 98 BPM | TEMPERATURE: 98.7 F | BODY MASS INDEX: 25.2 KG/M2 | HEIGHT: 71 IN

## 2025-04-03 DIAGNOSIS — S83.8X1D INJURY OF MENISCUS OF RIGHT KNEE, SUBSEQUENT ENCOUNTER: ICD-10-CM

## 2025-04-03 DIAGNOSIS — S22.32XD CLOSED FRACTURE OF ONE RIB OF LEFT SIDE WITH ROUTINE HEALING, SUBSEQUENT ENCOUNTER: Primary | ICD-10-CM

## 2025-04-03 PROCEDURE — 99213 OFFICE O/P EST LOW 20 MIN: CPT | Performed by: FAMILY MEDICINE

## 2025-04-03 PROCEDURE — G8428 CUR MEDS NOT DOCUMENT: HCPCS | Performed by: FAMILY MEDICINE

## 2025-04-03 PROCEDURE — 1036F TOBACCO NON-USER: CPT | Performed by: FAMILY MEDICINE

## 2025-04-03 PROCEDURE — G8419 CALC BMI OUT NRM PARAM NOF/U: HCPCS | Performed by: FAMILY MEDICINE

## 2025-04-03 ASSESSMENT — ENCOUNTER SYMPTOMS: BACK PAIN: 0

## 2025-04-03 NOTE — PROGRESS NOTES
[facility]ANS [place] [facility], [facility]  [address] [place], [place] [address]  Dept: [phone]  Dept Fax: [phone]  Loc: [phone]     4/3/2025    Visit type: Follow up    Reason for Visit: Follow-up (Patient is following up on his ribs/Patient states he is doing good ready to just get back to his routine)         Patient: Austen Nuno is a 50 y.o. male     HPI: 50-year-old male presents for follow-up visit pertaining to closed fracture of 1 rib on the left side and right knee meniscus injury.  This happened while at work, this is a workers comp case.  Patient denies any pain at this time.    ASSESSMENT/PLAN   Closed fracture of one rib of left side with routine healing, subsequent encounter  Injury of meniscus of right knee, subsequent encounter     -Pertinent exam/Discussion: Significant improvement overall, no pain at baseline.  No pain during my examination.    -Treatment options were discussed and all questions were answered  -Discussed use of ice and heat as needed, discussed activity modification  -Oral/topical medications: Acetaminophen as needed  -Imaging: Previous x-rays showed healing fracture of left rib  -Follow-up: Follow-up as needed, patient is cleared for return to work without restrictions.  Work restrictions note was provided.      No orders of the defined types were placed in this encounter.       Subjective      Review of Systems   Musculoskeletal:  Positive for myalgias. Negative for arthralgias, back pain, gait problem, joint swelling and neck pain.        Minimal intermittent pain of left ribs with certain motion, no pain of the right knee   Skin:  Negative for wound.   Neurological:  Negative for dizziness, weakness and numbness.      No Known Allergies    Current Outpatient Medications:     diclofenac (VOLTAREN) 75 MG EC tablet, Take 1 tablet by mouth 2 times daily as needed (Inflammation) Take with food (Patient not taking:

## 2025-04-14 ENCOUNTER — TELEPHONE (OUTPATIENT)
Age: 50
End: 2025-04-14

## 2025-04-14 NOTE — TELEPHONE ENCOUNTER
[facility] called and they need an updated metco 14 faxed over and the Dr Notes from 3/20 . Please advise

## 2025-04-17 ENCOUNTER — HOSPITAL ENCOUNTER (OUTPATIENT)
Dept: PHYSICAL THERAPY | Age: 50
Setting detail: THERAPIES SERIES
Discharge: HOME | End: 2025-04-17
Attending: FAMILY MEDICINE
Payer: COMMERCIAL

## 2025-04-17 PROCEDURE — 97110 THERAPEUTIC EXERCISES: CPT

## 2025-04-17 PROCEDURE — 97162 PT EVAL MOD COMPLEX 30 MIN: CPT

## 2025-04-17 NOTE — PLAN OF CARE
(degrees)  R Knee Flexion (0-145): 115 deg with tightness  R Knee Extension (0): 0 deg  PROM LLE (degrees)  L SLR: 70 deg       Strength  Strength RLE  R Hip Flexion: 3+/5  R Hip Extension: 4-/5  R Hip ABduction: 3+/5  R Knee Flexion: 4/5  R Knee Extension: 4+/5  R Ankle Dorsiflexion: 5/5  Strength LLE  L Hip Flexion: 4+/5  L Hip Extension: 4+/5  L Hip ABduction: 4+/5  L Knee Flexion: 5/5  L Knee Extension: 5/5  L Ankle Dorsiflexion: 5/5       Treatment:    Exercises  Exercises  Exercise 1: quad set 5 sec hold x 10, right  Exercise 2: SAQ 5 sec hold x 10  Exercise 3: 2 way SLR x 10, right  Exercise 4: seated heel slides, right 5 sec hold x 10  Exercise 5: seated hamstring stretch with step 20 sec hold x 3, right  Exercise 6: bike*  Exercise 7: prone hip extension*  Exercise 8: clamshells*  Exercise 9: hip circles*  Exercise 10: LAQ, hamstring curls*  Exercise 11: TKE*  Exercise 12: step ups*  Exercise 13: march with holds*  Exercise 14: monster walks*  Exercise 15: TG*  Exercise 20: HEP: quad sets, SAQ, 2 way SLR, seated heel slides, hamstring stretch     Modalities  Modalities  Modalities: Yes  Cryotherapy (CPT 62001)  Number Minutes Cryotherapy: *       *Indicates exercise,modality, or manual techniques to be initiated when appropriate    ASSESSMENT   Body Structures, Functions, Activity Limitations Requiring Skilled Therapeutic Intervention: Decreased functional mobility , Decreased ROM, Decreased strength, Decreased endurance  Assessment: Patient had right knee injury in October and reports continued pain but has improved slowly.  Upon PT evaluation, patient demonstrates weakness in right LE compared to left with impaired flexibility.  Further PT recommended to improve strength and ROM for improve ease to perform work duties.    Outcome Measure: LEFS 45/80     Evaluation Complexity:   Decision Making Medium  History Medium  Exam Medium  Clinical Presentation Medium    Barriers to this patient's plan of care or

## 2025-04-21 ENCOUNTER — HOSPITAL ENCOUNTER (OUTPATIENT)
Dept: PHYSICAL THERAPY | Age: 50
Setting detail: THERAPIES SERIES
Discharge: HOME | End: 2025-04-21
Attending: FAMILY MEDICINE
Payer: COMMERCIAL

## 2025-04-21 NOTE — PROGRESS NOTES
Therapy                            Cancellation/No-show Note    Date: 2025  Patient: Austen Nuno (50 y.o. male)  : 1975  MRN:  [id number]  Referring Physician: Gibran Kim MD    Medical Diagnosis: Sprain of medial collateral ligament of right knee, initial encounter [S83.900A]      Visit Information:  Insurance: Payor: [employer] / Plan: [employer] M[employer] [place] / Product Type: *No Product type* /   Visits to Date: 1   No Show/Cancelled Appts: 0 /       For today's appointment patient:  [x]  Cancelled  []  Rescheduled appointment  []  No-show   []  Called pt to remind of next appointment     Reason given by patient:  []  Patient ill  []  Conflicting appointment  []  No transportation    []  Conflict with work  []  No reason given  [x]  Other:  death  in  family     [x] Pt has future appointments scheduled, no follow up needed  [] Pt requests to be on hold.    Reason:   If > 2 weeks please discuss with therapist.  [] Therapist to call pt for follow up  []  to call pt to reschedule   Comments:       Signature: Electronically signed by Kathy Ortega PTA on 25 at 8:56 AM EDT

## 2025-04-24 ENCOUNTER — HOSPITAL ENCOUNTER (OUTPATIENT)
Dept: PHYSICAL THERAPY | Age: 50
Setting detail: THERAPIES SERIES
Discharge: HOME | End: 2025-04-24
Attending: FAMILY MEDICINE
Payer: COMMERCIAL

## 2025-04-24 PROCEDURE — 97110 THERAPEUTIC EXERCISES: CPT

## 2025-04-24 NOTE — PROGRESS NOTES
0801  Time Out: 0839  Minutes: 38  Timed Code Treatment Minutes: 38 Minutes  Procedure Minutes:0 minutes    Modality Time In Time Out Total Minutes Units   Ther ex ([id number]) [id number] 3     Electronically signed by Evelyn Cruz PT on 4/24/25 at 12:16 PM EDT

## 2025-04-28 ENCOUNTER — HOSPITAL ENCOUNTER (OUTPATIENT)
Dept: PHYSICAL THERAPY | Age: 50
Setting detail: THERAPIES SERIES
Discharge: HOME | End: 2025-04-28
Attending: FAMILY MEDICINE
Payer: COMMERCIAL

## 2025-04-28 PROCEDURE — 97110 THERAPEUTIC EXERCISES: CPT

## 2025-04-28 NOTE — PROGRESS NOTES
[facility]  Outpatient Physical Therapy    Treatment Note        Date: 2025  Patient: Austen Nuno  : 1975   Confirmed: Yes  MRN: [id number]  Referring Provider: Gibran Kim MD    Medical Diagnosis: Sprain of medial collateral ligament of right knee, initial encounter [S83.411A]       Treatment Diagnosis: right knee pain    Visit Information:  Insurance: Payor: [employer] / Plan: [employer] [place] / Product Type: *No Product type* /   PT Visit Information  PT Insurance Information: [facility]-Claim # [id number]  Total # of Visits Approved: 18  Total # of Visits to Date: 3  Plan of Care/Certification Expiration Date: 25  No Show: 0  Canceled Appointment: 1  Progress Note Counter: 3/18  (approved from 25 to 25)    Subjective Information:  Subjective: Patient reports soreness after last week. Is back to work full time, reports hesitancy to use Rt LE such as ascend ladders.  HEP Compliance:  [] Good [x] Fair [] Poor [] Reports not doing due to:               Pain Screening  Patient Currently in Pain: No    Treatment:  Exercises:  Exercises  Exercise 1: quad set 5 sec hold x 12, right  Exercise 3: 2 way SLR x 12, right  Exercise 4: supine heel slides 5 sec hold x 10, right with strap  Exercise 5: Standing hamstring stretch with step 20 sec hold x 3, right  Exercise 6: upright bike x 5 minutes, seat 2  Exercise 7: prone hip extension x15  Exercise 12: step ups 6'' x 10 forward, lateral; step downs with reverse step up 6'' x 10 all unsupported  Exercise 16: Gastroc str at steps 3/20secs  Exercise 17: Bridges 5'' x10  Exercise 18: Pball: DKTC 3'' x10, Hamstring sets into 3'' x10  Exercise 20: HEP: Gastroc str at wall issued verbally         *Indicates exercise, modality, or manual techniques to be initiated when appropriate    Objective Measures:          ROM: [] NT  [x] ROM measurements:         AROM RLE (degrees)  R Knee Flexion (0-145): 118deg with tightness supine

## 2025-05-01 ENCOUNTER — HOSPITAL ENCOUNTER (OUTPATIENT)
Dept: PHYSICAL THERAPY | Age: 50
Setting detail: THERAPIES SERIES
Discharge: HOME | End: 2025-05-01
Attending: FAMILY MEDICINE
Payer: COMMERCIAL

## 2025-05-01 PROCEDURE — 97110 THERAPEUTIC EXERCISES: CPT

## 2025-05-01 ASSESSMENT — PAIN SCALES - GENERAL: PAINLEVEL_OUTOF10: 3

## 2025-05-01 NOTE — PROGRESS NOTES
[facility]  Outpatient Physical Therapy    Treatment Note        Date: 2025  Patient: Austen Nuno  : 1975   Confirmed: Yes  MRN: [id number]  Referring Provider: Gibran Kim MD    Medical Diagnosis: Sprain of medial collateral ligament of right knee, initial encounter [S83.411A]       Treatment Diagnosis: right knee pain    Visit Information:  Insurance: Payor: [employer] / Plan: [employer] / Product Type: *No Product type* /   PT Visit Information  PT Insurance Information: [facility]-Claim # [id number]  Total # of Visits Approved: 18  Total # of Visits to Date: 3  Plan of Care/Certification Expiration Date: 25  No Show: 0  Canceled Appointment: 1  Progress Note Counter: 3/18  (approved from 25 to 25)    Subjective Information:  Subjective: Patient reports feeling mild soreness after last visit. Continues to have increased pain during work shift.  HEP Compliance:  [x] Good [] Fair [] Poor [] Reports not doing due to:    Pain Screening  Patient Currently in Pain: Yes  Pain Assessment: 0-10  Pain Level: 3 R knee     Treatment:  Exercises:  Exercises  Exercise 1: quad set 5 sec hold x 15, right  Exercise 3: 2 way SLR x 15, right  Exercise 5: Standing hamstring stretch with step 20 sec hold x 3, right  Exercise 6: upright bike x 5 minutes, seat 2  Exercise 7: prone hip extension x15  Exercise 12: step ups 6'' x 10 forward, lateral; step downs with reverse step up 6'' x 10  Exercise 13: march with 3 sec hold x 2 laps without UE support (grabs for bar occasionally)  Exercise 14: monster walks YTB forward, lateral, retro 1-2 laps  Exercise 15: ball squats x10  Exercise 17: Bridges 5'' x15  Exercise 18: Pball: DKTC 3'' x10, Hamstring sets into 3'' x10  Exercise 20: HEP: continue current   Assessment:   Body Structures, Functions, Activity Limitations Requiring Skilled Therapeutic Intervention: Decreased functional mobility , Decreased ROM, Decreased strength, Decreased

## 2025-05-06 ENCOUNTER — HOSPITAL ENCOUNTER (OUTPATIENT)
Dept: PHYSICAL THERAPY | Age: 50
Setting detail: THERAPIES SERIES
Discharge: HOME | End: 2025-05-06
Attending: FAMILY MEDICINE
Payer: COMMERCIAL

## 2025-05-06 PROCEDURE — 97110 THERAPEUTIC EXERCISES: CPT

## 2025-05-06 NOTE — PROGRESS NOTES
modifications this date.    Therapy Time:      PT Individual Minutes  Time In: 0841  Time Out: 0919  Minutes: 38  Timed Code Treatment Minutes: 38 Minutes  Procedure Minutes:0  Modality Time In Time Out Total Minutes Units   Ther ex ([id number]) [id number] 3         Electronically signed by Char Jackson PTA on 5/6/25 at 8:50 AM EDT

## 2025-05-09 ENCOUNTER — HOSPITAL ENCOUNTER (OUTPATIENT)
Dept: PHYSICAL THERAPY | Age: 50
Setting detail: THERAPIES SERIES
Discharge: HOME | End: 2025-05-09
Attending: FAMILY MEDICINE
Payer: COMMERCIAL

## 2025-05-09 PROCEDURE — 97110 THERAPEUTIC EXERCISES: CPT

## 2025-05-09 NOTE — PROGRESS NOTES
[facility]  Outpatient Physical Therapy    Treatment Note        Date: 2025  Patient: Austen Nuno  : 1975   Confirmed: Yes  MRN: [id number]  Referring Provider: Gibran Kim MD   Secondary Referring Provider (If applicable):     Medical Diagnosis: Sprain of medial collateral ligament of right knee, initial encounter [S87.124N]    Treatment Diagnosis: right knee pain    Visit Information:  Insurance: Payor: [employer] / Plan: [employer] MEN [employer] / Product Type: *No Product type* /   PT Visit Information  PT Insurance Information: [facility]-Claim # [id number]  Total # of Visits Approved: 18  Total # of Visits to Date: 5  Plan of Care/Certification Expiration Date: 25  No Show: 0  Canceled Appointment: 1  Progress Note Counter:   (approved from 25 to 25)    Subjective Information:  Subjective: \"Same old stuff\"  HEP Compliance:  [x] Good [] Fair [] Poor [] Reports not doing due to:    Pain Screening  Patient Currently in Pain: Denies    Treatment:  Exercises:  Exercises  Exercise 1: vectors **  Exercise 3: 2 way SLR 2x10  Exercise 5: seated hamstring stretch with step 20 sec hold x 3, right  Exercise 6: upright bike x 5 minutes, seat 2  Exercise 7: prone hip extension x20  Exercise 10: 4\" eccentric lowering x 10  Exercise 11: hip circles x15 =cw/ccw  Exercise 12: runners step up x15 6\"  Exercise 14: monster walks RTB forward, lateral, retro 3 laps  Exercise 15: STS at chair level with blue airex for elevation. x 10  Exercise 16: SLS 8\"- 20\" x4  Exercise 17: Bridges 5'' x10  Exercise 18: Resisted ambulation RTB+ GTB x 5 bouts retrostepping  Exercise 20: HEP: continue current        *Indicates exercise, modality, or manual techniques to be initiated when appropriate    Objective Measures:      Assessment:   Body Structures, Functions, Activity Limitations Requiring Skilled Therapeutic Intervention: Decreased functional mobility , Decreased ROM, Decreased

## 2025-05-13 ENCOUNTER — HOSPITAL ENCOUNTER (OUTPATIENT)
Dept: PHYSICAL THERAPY | Age: 50
Setting detail: THERAPIES SERIES
Discharge: HOME | End: 2025-05-13
Attending: FAMILY MEDICINE
Payer: COMMERCIAL

## 2025-05-13 PROCEDURE — 97110 THERAPEUTIC EXERCISES: CPT

## 2025-05-13 NOTE — PROGRESS NOTES
[facility]  Outpatient Physical Therapy    Treatment Note        Date: 2025  Patient: Austen Nuno  : 1975   Confirmed: Yes  MRN: [id number]  Referring Provider: Gibran Kim MD    Medical Diagnosis: Sprain of medial collateral ligament of right knee, initial encounter [S83.411A]       Treatment Diagnosis: right knee pain    Visit Information:  Insurance: Payor: [employer] / Plan: [employer] [place] / Product Type: *No Product type* /   PT Visit Information  PT Insurance Information: [facility]-Claim # [id number]  Total # of Visits Approved: 18  Total # of Visits to Date: 6  Plan of Care/Certification Expiration Date: 25  No Show: 0  Canceled Appointment: 1  Progress Note Counter:   (approved from 25 to 25)    Subjective Information:  Subjective: Patient denies pain this morning.  State he had increased pain and tightness yesterday.  HEP Compliance:  [x] Good [] Fair [] Poor [] Reports not doing due to:    Pain Screening  Patient Currently in Pain: Denies    Treatment:  Exercises:  Exercises  Exercise 1: vectors **  Exercise 3: 2 way SLR 2x10  Exercise 5: prone quad stretch 3x30\"  Exercise 6: upright bike x 5 minutes, seat 2  Exercise 11: hip circles x15 =cw/ccw  Exercise 12: runners step up x15 6\"  Exercise 14: monster walks RTB forward, lateral, retro , march 3 laps  Exercise 17: SLS rebounder forward only x15  Exercise 18: cable column walkouts 4 plates 4 way x4 ea  Exercise 20: HEP: continue current    Modalities:  Cryotherapy (CPT 72535)  Patient Position: Seated  Number Minutes Cryotherapy: 10  Cryotherapy location: Right, Knee (with light compression)  Cryotherapy specified location: R knee  Post treatment skin assessment: Intact       *Indicates exercise, modality, or manual techniques to be initiated when appropriate    Objective Measures:     ROM: [] NT  [x] ROM measurements:  AROM RLE (degrees)  R Knee Flexion (0-145): 125 degrees in supine     PROM RLE

## 2025-05-16 ENCOUNTER — HOSPITAL ENCOUNTER (OUTPATIENT)
Dept: PHYSICAL THERAPY | Age: 50
Setting detail: THERAPIES SERIES
Discharge: HOME | End: 2025-05-16
Attending: FAMILY MEDICINE
Payer: COMMERCIAL

## 2025-05-16 PROCEDURE — 97110 THERAPEUTIC EXERCISES: CPT

## 2025-05-16 NOTE — PROGRESS NOTES
[facility]  Outpatient Physical Therapy   Treatment Note        Date: 2025  Patient: Austen Nuno  : 1975   Confirmed: Yes  MRN: [id number]  Referring Provider: Gibran Kim MD      Medical Diagnosis: Sprain of medial collateral ligament of right knee, initial encounter [S83.411A]      Treatment Diagnosis: right knee pain    Visit Information:  Insurance: Payor: [employer] / Plan: [employer] MEN [employer] / Product Type: *No Product type* /   PT Visit Information  PT Insurance Information: [facility]-Claim # [id number]  Total # of Visits Approved: 18  Total # of Visits to Date: 7  Plan of Care/Certification Expiration Date: 25  No Show: 0  Canceled Appointment: 1  Progress Note Counter:   (approved from 25 to 25)    Subjective Information:  Subjective: Pt reports feel better without pain x 2 days. Compliant with HEP  HEP Compliance:  [x] Good [] Fair [] Poor [] Reports not doing due to:  Pain Screening  Patient Currently in Pain: Denies    Treatment:  Exercises:  Exercises  Exercise 1: vectors **  Exercise 2: 4 min assess strength and ROM  Exercise 3: 2 way SLR 2x15  Exercise 5: prone quad stretch 3x30\"  Exercise 6: upright bike x 5 minutes, seat 2 supervised by Esha PT  Exercise 11: hip circles x15 =cw/ccw  Exercise 12: runners step up x15 6\"  Exercise 14: monster walks RTB forward, lateral, retro , march 3 laps in parallel bars  Exercise 17: SLS rebounder forward only x15  Exercise 18: cable column walkouts 4 plates 4 way x4 ea  Exercise 20: HEP: continue current  Treatment Reasoning  Limitations addressed: Strength, Balance, Pain modulation    *Indicates exercise, modality, or manual techniques to be initiated when appropriate    Objective Measures:          STG 1 Current Status:: 0/10 pain x 2 days  STG 2 Current Status:: Compliant with HEP      LTG 1 Current Status:: AROM: SLR 55 deg near equal to L knee flex 112  LTG 2 Current Status:: 3+ to 4-/5 hip flex, knee

## 2025-05-20 ENCOUNTER — HOSPITAL ENCOUNTER (OUTPATIENT)
Dept: PHYSICAL THERAPY | Age: 50
Setting detail: THERAPIES SERIES
Discharge: HOME | End: 2025-05-20
Attending: FAMILY MEDICINE
Payer: COMMERCIAL

## 2025-05-20 PROCEDURE — 97110 THERAPEUTIC EXERCISES: CPT

## 2025-05-20 NOTE — PROGRESS NOTES
[facility]  Outpatient Physical Therapy   Treatment Note        Date: 2025  Patient: Austen Nuno  : 1975   Confirmed: Yes  MRN: [id number]  Referring Provider: Gibran Kim MD      Medical Diagnosis: Sprain of medial collateral ligament of right knee, initial encounter [S83.411A]      Treatment Diagnosis: right knee pain    Visit Information:  Insurance: Payor: [employer] / Plan: MIN[employer] MEN [employer] / Product Type: *No Product type* /   PT Visit Information  PT Insurance Information: [facility]-Claim # [id number]  Total # of Visits Approved: 18  Total # of Visits to Date: 8  Plan of Care/Certification Expiration Date: 25  No Show: 0  Canceled Appointment: 1  Progress Note Counter:   (approved from 25 to 25)    Subjective Information:  Subjective: Patient reports his knee is getting better.  HEP Compliance:  [x] Good [] Fair [] Poor [] Reports not doing due to:             Pain Screening  Patient Currently in Pain: Denies    Treatment:  Exercises:  Exercises  Exercise 1: vectors **  Exercise 3: 2 way SLR with 1.5# ankle weight x 12  Exercise 5: prone quad stretch 3x30\", right  Exercise 6: upright bike x 5 minutes, seat 1  Exercise 11: s/l hip circles with 1.5# ankle weight x 10 cw, ccw  Exercise 12: runners step up x15 6\"  Exercise 14: monster walks GTB forward, lateral, retro , march 2 laps in parallel bars  Exercise 18: cable column walkouts 5 plates 4 way x4 ea  Exercise 20: HEP: continue current     *Indicates exercise, modality, or manual techniques to be initiated when appropriate      Assessment:   Body Structures, Functions, Activity Limitations Requiring Skilled Therapeutic Intervention: Decreased functional mobility , Decreased ROM, Decreased strength, Decreased endurance  Assessment: Patient progressed with strengthening by increasing resistance.  Patient reports fatigue with SLR using ankle weights.  Patient demonstrates improved ease with ambulation with

## 2025-05-23 ENCOUNTER — HOSPITAL ENCOUNTER (OUTPATIENT)
Dept: PHYSICAL THERAPY | Age: 50
Setting detail: THERAPIES SERIES
Discharge: HOME | End: 2025-05-23
Attending: FAMILY MEDICINE
Payer: COMMERCIAL

## 2025-05-23 PROCEDURE — 97110 THERAPEUTIC EXERCISES: CPT

## 2025-05-23 NOTE — PROGRESS NOTES
[facility]  Outpatient Physical Therapy    Treatment Note        Date: 2025  Patient: Austen Nuno  : 1975   Confirmed: Yes  MRN: [id number]  Referring Provider: Gibran Kim MD    Medical Diagnosis: Sprain of medial collateral ligament of right knee, initial encounter [S83.411A]       Treatment Diagnosis: right knee pain    Visit Information:  Insurance: Payor: [employer] / Plan: MIN[employer] MEN [place] / Product Type: *No Product type* /   PT Visit Information  PT Insurance Information: [facility]-Claim # [id number]  Total # of Visits Approved: 18  Total # of Visits to Date: 9  Plan of Care/Certification Expiration Date: 25  No Show: 0  Canceled Appointment: 1  Progress Note Counter:   (approved from 25 to 25)    Subjective Information:  Subjective: Patient c/o mild soreness this date. States pain continues to be intermittent.  HEP Compliance:  [x] Good [] Fair [] Poor [] Reports not doing due to:    Pain Screening  Patient Currently in Pain: Denies    Treatment:  Exercises:  Exercises  Exercise 1: vectors  x5  Exercise 2: SLS on foam 20\"x3  Exercise 3: SLR- D/C to HEP  Exercise 4: SLS with reach x10  Exercise 5: SL squat on total gym level 6  Exercise 6: upright bike x 5 minutes, seat 1  Exercise 9: BOSU lunges forward/lateral x10 ea  Exercise 11: s/l hip circles- D/C to HEP  Exercise 12: runners step up x15 4\" + foam  Exercise 16: march with hold 3# weights 20 feet x6  Exercise 17: SLS rebounder 3 way  x15  Exercise 18: cable column walkouts 6 plates 5 way x4 ea  Exercise 20: HEP: continue current       Assessment:   Body Structures, Functions, Activity Limitations Requiring Skilled Therapeutic Intervention: Decreased functional mobility , Decreased ROM, Decreased strength, Decreased endurance  Assessment: Continued exercise progressions for R LE functional strength and stability. Patient demonstrates improved ability to maintain SLS with forward march. He continues

## 2025-05-27 ENCOUNTER — HOSPITAL ENCOUNTER (OUTPATIENT)
Dept: PHYSICAL THERAPY | Age: 50
Setting detail: THERAPIES SERIES
Discharge: HOME | End: 2025-05-27
Attending: FAMILY MEDICINE
Payer: COMMERCIAL

## 2025-05-27 PROCEDURE — 97110 THERAPEUTIC EXERCISES: CPT

## 2025-05-27 ASSESSMENT — PAIN DESCRIPTION - PAIN TYPE: TYPE: CHRONIC PAIN

## 2025-05-27 NOTE — PROGRESS NOTES
[facility]  Outpatient Physical Therapy    Treatment Note        Date: 2025  Patient: Austen Nuno  : 1975   Confirmed: Yes  MRN: [id number]  Referring Provider: Gibran Kim MD    Medical Diagnosis: Sprain of medial collateral ligament of right knee, initial encounter [S83.411A]       Treatment Diagnosis: right knee pain    Visit Information:  Insurance: Payor: [employer]EN [employer] / Plan: MINUTE MEN [employer] / Product Type: *No Product type* /   PT Visit Information  PT Insurance Information: [facility]-Claim # [id number]  Total # of Visits Approved: 18  Total # of Visits to Date: 10  Plan of Care/Certification Expiration Date: 25  No Show: 0  Canceled Appointment: 1  Progress Note Counter:   (approved from 25 to 25)    Subjective Information:  Subjective: Patient denies pain. States he is able to complete his job duties with minimal pain.  HEP Compliance:  [x] Good [] Fair [] Poor [] Reports not doing due to:  Pain Screening  Patient Currently in Pain: Denies  Pain Type: Chronic pain    Treatment:  Exercises:  Exercises  Exercise 1: vectors  x10  Exercise 2: SLS on foam 20\"x3  Exercise 3: SLR- D/C to HEP  Exercise 4: SLS with reach x10  Exercise 5: SL squat on total gym level 6  Exercise 6: upright bike x 5 minutes, seat 1  Exercise 9: BOSU lunges forward/lateral x10 ea  Exercise 11: s/l hip circles- D/C to HEP  Exercise 12: runners step up x15 6\" + foam  Exercise 15: 4 way hip RTB x10 ea  Exercise 17: SLS rebounder 3 way  x15  Exercise 18: cable column walkouts 7 plates 5 way x4 ea  Exercise 20: HEP: continue current  Treatment Reasoning  Limitations addressed: Strength, Balance, Pain modulation  Assessment:   Body Structures, Functions, Activity Limitations Requiring Skilled Therapeutic Intervention: Decreased functional mobility , Decreased ROM, Decreased strength, Decreased endurance  Assessment: Continued exercise progressions for right LE functional strength and

## 2025-05-30 ENCOUNTER — HOSPITAL ENCOUNTER (OUTPATIENT)
Dept: PHYSICAL THERAPY | Age: 50
Setting detail: THERAPIES SERIES
Discharge: HOME | End: 2025-05-30
Attending: FAMILY MEDICINE
Payer: COMMERCIAL

## 2025-05-30 PROCEDURE — 97110 THERAPEUTIC EXERCISES: CPT

## 2025-05-30 NOTE — PROGRESS NOTES
[facility]  Outpatient Physical Therapy    Treatment Note        Date: 2025  Patient: Austen Nuno  : 1975   Confirmed: Yes  MRN: [id number]  Referring Provider: Gibran Kim MD   Secondary Referring Provider (If applicable):     Medical Diagnosis: Sprain of medial collateral ligament of right knee, initial encounter [S83.411A]    Treatment Diagnosis: right knee pain    Visit Information:  Insurance: Payor: [employer]EN [employer] / Plan: MINUTE MEN [employer] / Product Type: *No Product type* /   PT Visit Information  PT Insurance Information: [facility]-Claim # [id number]  Total # of Visits Approved: 18  Total # of Visits to Date: 11  Plan of Care/Certification Expiration Date: 25  No Show: 0  Canceled Appointment: 1  Progress Note Counter: 10/18  (approved from 25 to 25)    Subjective Information:  Subjective: I am looking to DC from PT I feel comfortable with self management  HEP Compliance:  [x] Good [] Fair [] Poor [] Reports not doing due to:    Pain Screening  Patient Currently in Pain: Denies    Treatment:  Exercises:  Exercises  Exercise 2: simulation lunges wht UE support and Furniture slider x 5  Exercise 3: TM walking- jog x 2 mins  Exercise 4: LAQ with adductor set:  Exercise 5: Butt taps with GTB around knees  Exercise 6: upright bike x 6 minutes, seat 1  Exercise 8: OBJ measures  Exercise 20: HEP: continue current+ monster walks, LAQ with adductor set, banded butt taps        *Indicates exercise, modality, or manual techniques to be initiated when appropriate    Objective Measures:         STG 1 Current Status:: 25: Denies pain at this time and has been consistent with no pain.  STG 2 Current Status:: 25: Reports Dc'd HEP due to feeling recovered. Reviewed self management with good verbalized understanding.      LTG 1 Current Status:: 25: RLE SLR  73°, 118°, RLE SLR 89°,   Knee flexion 131°  LTG 2 Current Status:: 25: 4+/5 LLE 
Limitations Requiring Skilled Therapeutic Intervention: Decreased functional mobility , Decreased ROM, Decreased strength, Decreased endurance  Assessment: Reviewed goals this date due  Therapy Prognosis: Good  Rehab Potential: []Excellent [x] Good  [] Fair  [] Poor [] Guarded         PLAN: [x]  Frequency/Duration:  Plan Frequency: DC  Plan weeks: 6-8 per [facility]  Current Treatment Recommendations: Strengthening, ROM, Functional mobility training, Endurance training, Gait training, Stair training, Neuromuscular re-education, Home exercise program, Safety education & training, Patient/Caregiver education & training, Modalities  Modalities: Heat/Cold, E-stim - unattended  Additional Comments: DC patient request     Precautions:                            Patient Status:[] Continue/ Initiate plan of Care    [x] Discharge PT.  Recommend pt continue with HEP.     [] Additional visits requested, Please re-certify for additional visits:    [] Hold         Signature: Electronically signed by Johnnie Del Rosario PTA on 5/30/25 at 2:46 PM EDT      If you have any questions or concerns, please don't hesitate to call.  Thank you for your referral.    I have reviewed this plan of care and certify a need for medically necessary rehabilitation services.    Physician Signature:__________________________________________________________  Date:  Please sign and return